# Patient Record
Sex: FEMALE | Race: WHITE | NOT HISPANIC OR LATINO | Employment: FULL TIME | URBAN - METROPOLITAN AREA
[De-identification: names, ages, dates, MRNs, and addresses within clinical notes are randomized per-mention and may not be internally consistent; named-entity substitution may affect disease eponyms.]

---

## 2017-06-22 ENCOUNTER — OFFICE VISIT (OUTPATIENT)
Dept: SURGERY | Facility: CLINIC | Age: 62
End: 2017-06-22

## 2017-06-22 VITALS — BODY MASS INDEX: 23.14 KG/M2 | HEIGHT: 63 IN | HEART RATE: 73 BPM | WEIGHT: 130.6 LBS | OXYGEN SATURATION: 98 %

## 2017-06-22 DIAGNOSIS — K64.5 THROMBOSED EXTERNAL HEMORRHOID: Primary | ICD-10-CM

## 2017-06-22 PROCEDURE — 99241 PR OFFICE CONSULTATION NEW/ESTAB PATIENT 15 MIN: CPT | Performed by: SURGERY

## 2017-06-22 RX ORDER — BUPROPION HYDROCHLORIDE 150 MG/1
150 TABLET ORAL EVERY MORNING
COMMUNITY
Start: 2017-06-11

## 2017-06-22 RX ORDER — MELOXICAM 15 MG/1
15 TABLET ORAL DAILY
COMMUNITY
Start: 2017-05-21 | End: 2021-11-22

## 2017-06-22 RX ORDER — OXYBUTYNIN CHLORIDE 10 MG/1
10 TABLET, EXTENDED RELEASE ORAL DAILY
COMMUNITY
Start: 2017-05-03 | End: 2021-11-22

## 2017-06-25 NOTE — PROGRESS NOTES
CLINICAL SUMMARY (A/P):    61-year-old lady with small thrombosed external hemorrhoid which will resolve on its own and requires no further intervention or workup.      CC:  Anal mass    HPI:  61-year-old lady developed anal mass/protrusion one week ago after suffering from intermittent constipation in the last year.    PHYSICAL EXAM:   Gastrointestinal: Rectal examination reveals a small thrombosed external hemorrhoid, minimally tender    Paresh Mata M.D.

## 2018-08-29 DIAGNOSIS — L24.7 IRRITANT CONTACT DERMATITIS DUE TO PLANTS, EXCEPT FOOD: ICD-10-CM

## 2018-08-29 RX ORDER — TRIAMCINOLONE ACETONIDE 5 MG/G
CREAM TOPICAL 3 TIMES DAILY
Qty: 60 G | Refills: 0 | OUTPATIENT
Start: 2018-08-29

## 2021-11-22 ENCOUNTER — OFFICE VISIT (OUTPATIENT)
Dept: SURGERY | Facility: CLINIC | Age: 66
End: 2021-11-22

## 2021-11-22 VITALS — BODY MASS INDEX: 23.92 KG/M2 | HEIGHT: 63 IN | WEIGHT: 135 LBS

## 2021-11-22 DIAGNOSIS — M94.0 COSTOCHONDRITIS: Primary | ICD-10-CM

## 2021-11-22 PROCEDURE — 99204 OFFICE O/P NEW MOD 45 MIN: CPT | Performed by: SURGERY

## 2021-11-22 RX ORDER — MELOXICAM 15 MG/1
15 TABLET ORAL DAILY
Qty: 14 TABLET | Refills: 0 | Status: SHIPPED | OUTPATIENT
Start: 2021-11-22 | End: 2022-03-16

## 2021-11-22 RX ORDER — NITROFURANTOIN 25; 75 MG/1; MG/1
100 CAPSULE ORAL AS NEEDED
COMMUNITY
Start: 2021-02-03

## 2021-11-22 RX ORDER — FLUTICASONE PROPIONATE 50 MCG
2 SPRAY, SUSPENSION (ML) NASAL DAILY
COMMUNITY

## 2021-11-22 RX ORDER — ONDANSETRON 4 MG/1
4 TABLET, FILM COATED ORAL AS NEEDED
COMMUNITY
Start: 2021-10-19

## 2021-11-22 RX ORDER — OMEPRAZOLE 40 MG/1
40 CAPSULE, DELAYED RELEASE ORAL DAILY
COMMUNITY
Start: 2021-11-21

## 2021-11-22 RX ORDER — MULTIVIT WITH MINERALS/LUTEIN
1000 TABLET ORAL DAILY
COMMUNITY
End: 2022-04-27

## 2021-11-22 NOTE — PROGRESS NOTES
SUMMARY (A/P):    66-year-old lady with right flank pain and point tenderness along right lateral costal margin.  Etiology is unclear although given the temporal relationship to lifting episode in September, likely represents musculoskeletal strain.  With the point tenderness at the costal margin, costochondritis is also a possibility.  I reassured her that laparoscopic clips used during cholecystectomy are routine and have absolutely nothing to do with any symptoms she is experiencing.  After discussing the likely musculoskeletal source of her pain, we discussed options of doing nothing as her pain is slowly improving versus adding nonsteroidal anti-inflammatory and she would prefer the latter option.  Along those lines, I have sent in prescription for Mobic 15 mg p.o. daily for 2 weeks to see if this will help.      CC:    Right flank pain    HPI:    66-year-old lady indicates that she was lifting her dog in early September and developed sudden right flank pain and tenderness.  This has been slowly but steadily improving.  Plain x-rays were normal except for demonstrating clips used during laparoscopic cholecystectomy.    ENDOSCOPY:   • EGD and colonoscopy 2012: Normal    PREVIOUS ABDOMINAL SURGERY    • Laparoscopic cholecystectomy with cholangiogram  •  section  • Appendectomy  • Ovarian cyst removal    PMH:    • Anxiety  • Arthritis  • Gastroesophageal reflux disease (she indicates that she really does not have heartburn or reflux symptoms but was told that she might have laryngopharyngeal reflux during ENT evaluation for which she was started on Prilosec.  Not surprisingly, she has not seen any real change in her symptoms with taking Prilosec as I do not feel that her symptoms are related to reflux given my conversation with her)    ALLERGIES:   • Reviewed    MEDICATIONS:   • Reviewed    FAMILY HISTORY:    • Colorectal cancer: Negative    SOCIAL HISTORY:   • Denies tobacco use  • Occasional alcohol  use    PHYSICAL EXAM:   • Constitutional: Well-developed well-nourished, no acute distress  • Vital signs: Weight 135 pounds, height 63 inches, BMI 23.9  • Respiratory: Clear to auscultation, normal inspiratory effort  • Cardiovascular: Regular rate, no jugular venous distention  • Gastrointestinal: Soft, nontender, no palpable mass, no hepatosplenomegaly, negative for hernia.  She is tender over the lateral aspect of the costal margin at 1 isolated point.  There is no palpable subcutaneous or rib mass at that point.  • Lymphatics (palpable nodes):  cervical-negative, inguinal-negative  • Skin:  Warm, dry, no rash on visualized skin surfaces  • Musculoskeletal: Symmetric strength, normal gait  • Psychiatric: Alert and oriented ×3, normal affect     ROS:    No cough, chest pain, shortness of air.  All other systems reviewed and negative other than presenting complaints.    ROYA CORDOBA M.D.

## 2022-01-05 ENCOUNTER — TELEPHONE (OUTPATIENT)
Dept: SURGERY | Facility: CLINIC | Age: 67
End: 2022-01-05

## 2022-01-05 DIAGNOSIS — R31.0 GROSS HEMATURIA: ICD-10-CM

## 2022-01-05 DIAGNOSIS — R10.84 GENERALIZED ABDOMINAL PAIN: Primary | ICD-10-CM

## 2022-01-05 NOTE — TELEPHONE ENCOUNTER
HUB TO SHARE    OK PER [facility] VERBAL RELEASE    PER DR MATA PATIENT SHOULD STAY OFF OF MOBIC, CT ABD & PELVIS ORDER PLACED & URINALYSIS ORDER PLACED.   PLEASE RELAY BELOW MESSAGE THAT WAS SENT TO PATIENT VIA HER [employer] ALSO.    I left you a voicemail at [phone] letting you know that Dr. Mata has placed an order for you to have a CT scan of the Abdomen & Pelvis and also a Urinalysis order. It can take anywhere from 3 to 5 business days before you receive a call from [facility] Central Scheduling. If you would like to contact them directly you may call [phone] to schedule your CT scan but please be aware that it may require a prior authorization from our office and that may take a few days to get approved. You do not need to schedule an appointment for the Urinalysis so you can go Monday - Friday 6:00 am to 5:00 pm. You would need to go to the main entrance (A) of the hospital campus and someone will direct you where the Outpatient Lab is located.    Should you have any additional questions, please do not hesitate to contact me directly.  Thank You,  EDMUNDO Shah

## 2022-01-05 NOTE — TELEPHONE ENCOUNTER
----- Message from Sherrene H. Mills sent at 1/4/2022  5:04 PM EST -----  Regarding: Tenderness pain under bilateral rib cages   Thank you so much

## 2022-01-23 PROBLEM — J06.9 VIRAL UPPER RESPIRATORY TRACT INFECTION: Status: ACTIVE | Noted: 2022-01-23

## 2022-01-27 ENCOUNTER — APPOINTMENT (OUTPATIENT)
Dept: CT IMAGING | Facility: HOSPITAL | Age: 67
End: 2022-01-27

## 2022-03-07 DIAGNOSIS — R31.0 GROSS HEMATURIA: ICD-10-CM

## 2022-03-07 DIAGNOSIS — R10.84 GENERALIZED ABDOMINAL PAIN: ICD-10-CM

## 2022-03-08 ENCOUNTER — TELEPHONE (OUTPATIENT)
Dept: SURGERY | Facility: CLINIC | Age: 67
End: 2022-03-08

## 2022-03-08 NOTE — TELEPHONE ENCOUNTER
----- Message from Paresh Mata MD sent at 3/8/2022  7:43 AM EST -----  Please let her know that her CT scan was normal

## 2022-03-09 ENCOUNTER — TELEPHONE (OUTPATIENT)
Dept: SURGERY | Facility: CLINIC | Age: 67
End: 2022-03-09

## 2022-03-16 ENCOUNTER — PATIENT ROUNDING (BHMG ONLY) (OUTPATIENT)
Dept: ORTHOPEDIC SURGERY | Facility: CLINIC | Age: 67
End: 2022-03-16

## 2022-03-16 ENCOUNTER — OFFICE VISIT (OUTPATIENT)
Dept: ORTHOPEDIC SURGERY | Facility: CLINIC | Age: 67
End: 2022-03-16

## 2022-03-16 VITALS — TEMPERATURE: 96.9 F | BODY MASS INDEX: 22.86 KG/M2 | WEIGHT: 129 LBS | HEIGHT: 63 IN

## 2022-03-16 DIAGNOSIS — M25.562 LEFT KNEE PAIN, UNSPECIFIED CHRONICITY: Primary | ICD-10-CM

## 2022-03-16 DIAGNOSIS — M23.92 LOCKING OF LEFT KNEE: ICD-10-CM

## 2022-03-16 PROCEDURE — 73562 X-RAY EXAM OF KNEE 3: CPT | Performed by: ORTHOPAEDIC SURGERY

## 2022-03-16 PROCEDURE — 99204 OFFICE O/P NEW MOD 45 MIN: CPT | Performed by: ORTHOPAEDIC SURGERY

## 2022-03-16 NOTE — PROGRESS NOTES
"Patient Name: Sherrene H Mills   YOB: 1955  Referring Primary Care Physician: Elena Yip MD  BMI: Body mass index is 22.85 kg/m².    Chief Complaint:    Chief Complaint   Patient presents with   • Left Knee - Pain        HPI:     Sherrene H Mills is a 66 y.o. female who presents today for evaluation of   Chief Complaint   Patient presents with   • Left Knee - Pain   Patient is seen today complaining of left knee pain.  She says she has seen Dr. Martin peres in the past and she believes had a right knee scope for meniscectomy at that time.  She says approximately 4 days ago she was carrying something through the garage had to twist to get to the door and felt a \"zinging\" in her knee.  She says if she walks with her leg with the toes pointed forward does pretty well if she attempts to rotate her knee it hurts any kind of twisting causes it to \"zinging\" she works at [email or web address].  She has been taking ibuprofen up to 200 3 times daily and acetaminophen 500 twice daily that do help.  She is also wearing a wraparound brace that she has with her today      Subjective   Medications:   Home Medications:  Current Outpatient Medications on File Prior to Visit   Medication Sig   • ascorbic acid (VITAMIN C) 1000 MG tablet Take 1,000 mg by mouth Daily.   • buPROPion XL (WELLBUTRIN XL) 150 MG 24 hr tablet Take 150 mg by mouth Every Morning.   • Cholecalciferol (VITAMIN D3 PO) Take  by mouth.   • MAGNESIUM PO Take  by mouth.   • Menaquinone-7 (VITAMIN K2 PO) Take 1 tablet by mouth Daily.   • Multiple Vitamins-Minerals (ZINC PO) Take 1 tablet by mouth Daily.   • ondansetron (ZOFRAN) 4 MG tablet Take 4 mg by mouth As Needed.   • fluticasone (FLONASE) 50 MCG/ACT nasal spray 2 sprays into the nostril(s) as directed by provider Daily.   • nitrofurantoin, macrocrystal-monohydrate, (MACROBID) 100 MG capsule Take 100 mg by mouth 2 (Two) Times a Day.   • omeprazole (priLOSEC) 40 MG capsule Take 40 mg by " mouth Daily.   • VITAMIN D PO Take 1 tablet by mouth Daily.   • [DISCONTINUED] meloxicam (Mobic) 15 MG tablet Take 1 tablet by mouth Daily.     No current facility-administered medications on file prior to visit.     Current Medications:  Scheduled Meds:  Continuous Infusions:No current facility-administered medications for this visit.    PRN Meds:.    I have reviewed the patient's medical history in detail and updated the computerized patient record.  Review and summarization of old records includes:    Past Medical History:   Diagnosis Date   • Anxiety    • Arthritis    • GERD (gastroesophageal reflux disease)    • H/O bladder infections    • Hearing loss    • Laryngopharyngeal reflux (LPR)    • Lumbar disc disease    • Mal de debarquement    • PONV (postoperative nausea and vomiting)         Past Surgical History:   Procedure Laterality Date   • APPENDECTOMY N/A    • CARPAL TUNNEL RELEASE     •  SECTION N/A    • ENDOMETRIAL ABLATION N/A    • ENDOSCOPY AND COLONOSCOPY N/A 2012    Normal upper endoscopy and normal colon; Dr. Paresh Mata   • ENDOSCOPY AND COLONOSCOPY N/A 2002    Three seperate prepyloric ulcers, mild duodenitis, normal remainder of stomach and esophagus, normal colonoscopy with excellent bowel prep; Dr. Paresh Mata   • EYE SURGERY     • KNEE ARTHROSCOPY W/ PARTIAL MEDIAL MENISCECTOMY Left 2006    Dr. Raudel De Guzman   • LAPAROSCOPIC CHOLECYSTECTOMY N/A 2012    Dr. Paresh Mata   • LUMBAR EPIDURAL INJECTION N/A    • OVARIAN CYST REMOVAL     • TONSILLECTOMY AND ADENOIDECTOMY Bilateral         Social History     Occupational History   • Not on file   Tobacco Use   • Smoking status: Never Smoker   • Smokeless tobacco: Never Used   Vaping Use   • Vaping Use: Never used   Substance and Sexual Activity   • Alcohol use: Yes     Alcohol/week: 0.0 standard drinks     Comment: Occasional   • Drug use: Never   • Sexual activity: Not Currently      "Birth control/protection: Post-menopausal      Social History     Social History Narrative   • Not on file        Family History   Problem Relation Age of Onset   • Cancer Father         Bladder   • Heart disease Father    • Arthritis Father    • Hypertension Father    • Hypertension Mother    • Arthritis Mother        ROS: 14 point review of systems was performed and all other systems were reviewed and are negative except for documented findings in HPI and today's encounter.     Allergies:   Allergies   Allergen Reactions   • Codeine Hives, Shortness Of Breath and Rash   • Percocet [Oxycodone-Acetaminophen] Nausea And Vomiting   • Sulfa Antibiotics Rash     Constitutional:  Denies fever, shaking or chills   Eyes:  Denies change in visual acuity   HENT:  Denies nasal congestion or sore throat   Respiratory:  Denies cough or shortness of breath   Cardiovascular:  Denies chest pain or severe LE edema   GI:  Denies abdominal pain, nausea, vomiting, bloody stools or diarrhea   Musculoskeletal:  Numbness, tingling, pain, or loss of motor function only as noted above in history of present illness.  : Denies painful urination or hematuria  Integument:  Denies rash, lesion or ulceration   Neurologic:  Denies headache or focal weakness  Endocrine:  Denies lymphadenopathy  Psych:  Denies confusion or change in mental status   Hem:  Denies active bleeding    OBJECTIVE:  Physical Exam: 66 y.o. female  Wt Readings from Last 3 Encounters:   03/16/22 58.5 kg (129 lb)   01/23/22 61.2 kg (135 lb)   11/22/21 61.2 kg (135 lb)     Ht Readings from Last 1 Encounters:   03/16/22 160 cm (63\")     Body mass index is 22.85 kg/m².  Vitals:    03/16/22 1027   Temp: 96.9 °F (36.1 °C)     Vital signs reviewed.     General Appearance:    Alert, cooperative, in no acute distress                  Eyes: conjunctiva clear  ENT: external ears and nose atraumatic  CV: no peripheral edema  Resp: normal respiratory effort  Skin: no rashes or wounds; " normal turgor  Psych: mood and affect appropriate  Lymph: no nodes appreciated  Neuro: gross sensation intact  Vascular:  Palpable peripheral pulse in noted extremity  Musculoskeletal Extremities: Exam today shows pleasant lady any attempts to Kristine's cause her pain medially her ligaments feel grossly stable see any bruising there is mild swelling but no true effusion.  She has apprehensive range of motion present    Radiology:   AP lateral 30 degree PA x-ray left knee taken the office today for complaints of pain without comparison views show least moderate arthritis.        Assessment:     ICD-10-CM ICD-9-CM   1. Left knee pain, unspecified chronicity  M25.562 719.46   2. Locking of left knee  M23.92 717.9        MDM/Plan:   The diagnosis(es), natural history, pathophysiology and treatment for diagnosis(es) were discussed. Opportunity given and questions answered.  Biomechanics of pertinent body areas discussed.  When appropriate, the use of ambulatory aids discussed.    MEDICATIONS:  The risks, benefits, warnings,side effects and alternatives of medications discussed.  Inflammation/pain control; with cold, heat, elevation and/or liniments discussed as appropriate  MRI.  Because of her mechanical symptoms and history and suspicious for meniscal tear.  She denies any falls or slips or anything which just a simple low-energy twist.  1 follow-up after the MRI talked about possible scenarios according to what is found as far as meniscal pathology and/or amount of arthritis.    3/16/2022    Dictated utilizing Dragon dictation

## 2022-03-16 NOTE — PROGRESS NOTES
A [employer] message has been sent to the patient for PATIENT ROUNDING with [facility]

## 2022-03-22 ENCOUNTER — TELEPHONE (OUTPATIENT)
Dept: ORTHOPEDIC SURGERY | Facility: CLINIC | Age: 67
End: 2022-03-22

## 2022-03-22 NOTE — TELEPHONE ENCOUNTER
----- Message from Sherrene H. Mills sent at 3/21/2022  6:07 PM EDT -----  Regarding: MRI   Can we have another doctor review it to let me know the results   Thank you

## 2022-03-22 NOTE — TELEPHONE ENCOUNTER
Please let her know that it looks like she has a meniscus tear and a small cyst in the back of her knee.  She can talk to Dr. Yarbrough when he gets back about options to address this.

## 2022-03-28 ENCOUNTER — TELEPHONE (OUTPATIENT)
Dept: ORTHOPEDIC SURGERY | Facility: CLINIC | Age: 67
End: 2022-03-28

## 2022-03-31 ENCOUNTER — TELEPHONE (OUTPATIENT)
Dept: ORTHOPEDIC SURGERY | Facility: CLINIC | Age: 67
End: 2022-03-31

## 2022-03-31 NOTE — TELEPHONE ENCOUNTER
Per last telephone encounter Dr. Yarbrough wants to see the patient in office. Please call to schedule

## 2022-03-31 NOTE — TELEPHONE ENCOUNTER
Provider:     Caller: PATIENT    Relationship to Patient: SELF    Phone Number:  [phone]    Reason for Call:  PT. STATES THAT DR. MONROE HAD BEEN OUT OF OFFICE AND ANOTHER DOCTOR HAD REVIEWED HER MRI OF LEFT KNEE RESULTS.   SHE WOULD LIKE TO KNOW WHAT DR. MONROE ADVISES FOR HER NEXT STEP.   ASKING FOR CALL BACK FROM DR. MONROE OR ASSISTANT PLEASE.    When was the patient last seen: 03/16/22

## 2022-04-14 ENCOUNTER — PREP FOR SURGERY (OUTPATIENT)
Dept: OTHER | Facility: HOSPITAL | Age: 67
End: 2022-04-14

## 2022-04-14 ENCOUNTER — OFFICE VISIT (OUTPATIENT)
Dept: ORTHOPEDIC SURGERY | Facility: CLINIC | Age: 67
End: 2022-04-14

## 2022-04-14 VITALS — BODY MASS INDEX: 23.92 KG/M2 | HEIGHT: 63 IN | WEIGHT: 135 LBS

## 2022-04-14 DIAGNOSIS — S83.242D ACUTE MEDIAL MENISCUS TEAR OF LEFT KNEE, SUBSEQUENT ENCOUNTER: ICD-10-CM

## 2022-04-14 DIAGNOSIS — S83.242A ACUTE MEDIAL MENISCUS TEAR OF LEFT KNEE, INITIAL ENCOUNTER: Primary | ICD-10-CM

## 2022-04-14 DIAGNOSIS — M23.91 LOCKING OF RIGHT KNEE: Primary | ICD-10-CM

## 2022-04-14 PROCEDURE — 99214 OFFICE O/P EST MOD 30 MIN: CPT | Performed by: ORTHOPAEDIC SURGERY

## 2022-04-14 PROCEDURE — 20610 DRAIN/INJ JOINT/BURSA W/O US: CPT | Performed by: ORTHOPAEDIC SURGERY

## 2022-04-14 RX ORDER — CEFAZOLIN SODIUM 2 G/100ML
2 INJECTION, SOLUTION INTRAVENOUS ONCE
Status: CANCELLED | OUTPATIENT
Start: 2022-04-29 | End: 2022-04-14

## 2022-04-14 RX ADMIN — METHYLPREDNISOLONE ACETATE 80 MG: 80 INJECTION, SUSPENSION INTRA-ARTICULAR; INTRALESIONAL; INTRAMUSCULAR; SOFT TISSUE at 08:49

## 2022-04-14 NOTE — PROGRESS NOTES
"Patient Name: Sherrene H Mills   YOB: 1955  Referring Primary Care Physician: Elena Yip MD  BMI: Body mass index is 23.91 kg/m².    Chief Complaint:  No chief complaint on file.  Follow-up MRI and left knee pain with new right knee pain    HPI:     Sherrene H Mills is a 66 y.o. female who presents today for evaluation of No chief complaint on file.  .  Patient follows up today on her MRI of her left knee.  It does show a meniscal tear.  She has tenderness.  Feels a \"stinging\" is not getting better.  She had since then patient is symptoms of her right knee.  They are very similar.  There is more swelling.  She is having trouble walking.      Subjective   Medications:   Home Medications:  Current Outpatient Medications on File Prior to Visit   Medication Sig   • ascorbic acid (VITAMIN C) 1000 MG tablet Take 1,000 mg by mouth Daily.   • buPROPion XL (WELLBUTRIN XL) 150 MG 24 hr tablet Take 150 mg by mouth Every Morning.   • Cholecalciferol (VITAMIN D3 PO) Take  by mouth.   • fluticasone (FLONASE) 50 MCG/ACT nasal spray 2 sprays into the nostril(s) as directed by provider Daily.   • MAGNESIUM PO Take  by mouth.   • Menaquinone-7 (VITAMIN K2 PO) Take 1 tablet by mouth Daily.   • Multiple Vitamins-Minerals (ZINC PO) Take 1 tablet by mouth Daily.   • nitrofurantoin, macrocrystal-monohydrate, (MACROBID) 100 MG capsule Take 100 mg by mouth 2 (Two) Times a Day.   • omeprazole (priLOSEC) 40 MG capsule Take 40 mg by mouth Daily.   • ondansetron (ZOFRAN) 4 MG tablet Take 4 mg by mouth As Needed.   • VITAMIN D PO Take 1 tablet by mouth Daily.     No current facility-administered medications on file prior to visit.     Current Medications:  Scheduled Meds:  Continuous Infusions:No current facility-administered medications for this visit.    PRN Meds:.    I have reviewed the patient's medical history in detail and updated the computerized patient record.  Review and summarization of old records " includes:    Past Medical History:   Diagnosis Date   • Anxiety    • Arthritis    • GERD (gastroesophageal reflux disease)    • H/O bladder infections    • Hearing loss    • Laryngopharyngeal reflux (LPR)    • Lumbar disc disease    • Mal de debarquement    • PONV (postoperative nausea and vomiting)         Past Surgical History:   Procedure Laterality Date   • APPENDECTOMY N/A    • CARPAL TUNNEL RELEASE     •  SECTION N/A    • ENDOMETRIAL ABLATION N/A    • ENDOSCOPY AND COLONOSCOPY N/A 2012    Normal upper endoscopy and normal colon; Dr. Paresh Mata   • ENDOSCOPY AND COLONOSCOPY N/A 2002    Three seperate prepyloric ulcers, mild duodenitis, normal remainder of stomach and esophagus, normal colonoscopy with excellent bowel prep; Dr. Paresh Mata   • EYE SURGERY     • KNEE ARTHROSCOPY W/ PARTIAL MEDIAL MENISCECTOMY Left 2006    Dr. Raudel De Guzman   • LAPAROSCOPIC CHOLECYSTECTOMY N/A 2012    Dr. Paresh Mata   • LUMBAR EPIDURAL INJECTION N/A    • OVARIAN CYST REMOVAL     • TONSILLECTOMY AND ADENOIDECTOMY Bilateral         Social History     Occupational History   • Not on file   Tobacco Use   • Smoking status: Never Smoker   • Smokeless tobacco: Never Used   Vaping Use   • Vaping Use: Never used   Substance and Sexual Activity   • Alcohol use: Yes     Alcohol/week: 0.0 standard drinks     Comment: Occasional   • Drug use: Never   • Sexual activity: Not Currently     Birth control/protection: Post-menopausal      Social History     Social History Narrative   • Not on file        Family History   Problem Relation Age of Onset   • Cancer Father         Bladder   • Heart disease Father    • Arthritis Father    • Hypertension Father    • Hypertension Mother    • Arthritis Mother        ROS: 14 point review of systems was performed and all other systems were reviewed and are negative except for documented findings in HPI and today's encounter.     Allergies:  "  Allergies   Allergen Reactions   • Codeine Hives, Shortness Of Breath and Rash   • Percocet [Oxycodone-Acetaminophen] Nausea And Vomiting   • Sulfa Antibiotics Rash     Constitutional:  Denies fever, shaking or chills   Eyes:  Denies change in visual acuity   HENT:  Denies nasal congestion or sore throat   Respiratory:  Denies cough or shortness of breath   Cardiovascular:  Denies chest pain or severe LE edema   GI:  Denies abdominal pain, nausea, vomiting, bloody stools or diarrhea   Musculoskeletal:  Numbness, tingling, pain, or loss of motor function only as noted above in history of present illness.  : Denies painful urination or hematuria  Integument:  Denies rash, lesion or ulceration   Neurologic:  Denies headache or focal weakness  Endocrine:  Denies lymphadenopathy  Psych:  Denies confusion or change in mental status   Hem:  Denies active bleeding    OBJECTIVE:  Physical Exam: 66 y.o. female  Wt Readings from Last 3 Encounters:   04/14/22 61.2 kg (135 lb)   03/16/22 58.5 kg (129 lb)   01/23/22 61.2 kg (135 lb)     Ht Readings from Last 1 Encounters:   04/14/22 160 cm (63\")     Body mass index is 23.91 kg/m².  There were no vitals filed for this visit.  Vital signs reviewed.     General Appearance:    Alert, cooperative, in no acute distress                  Eyes: conjunctiva clear  ENT: external ears and nose atraumatic  CV: no peripheral edema  Resp: normal respiratory effort  Skin: no rashes or wounds; normal turgor  Psych: mood and affect appropriate  Lymph: no nodes appreciated  Neuro: gross sensation intact  Vascular:  Palpable peripheral pulse in noted extremity  Musculoskeletal Extremities: Exam shows swelling more on the right than the left but she has joint line tenderness Kristine's causes tenderness medially on the left she has similar findings on the right.  She is stiff and a little apprehensive with swelling    Radiology:   AP lateral 40 degree PA x-rays previously taken with views of both " knees and a show some joint space remaining.  She had an MRI of her left knee which she follows up on today which shows horizontal oblique tear of the body medial meniscus extending to the inferior articular surface, mild thickening of the medial articular cartilage suggesting mild chondral degeneration.  There is also some in the apical patellar cartilage.  Fluid collection along the posterior lateral aspect of the knee at the origin of the lateral head of the gastrocnemius muscle measuring 1.6 x 0.8 cm likely reflecting a septated synovial ganglion cyst with minimal joint effusion        Assessment:     ICD-10-CM ICD-9-CM   1. Locking of right knee  M23.91 717.9   2. Acute medial meniscus tear of left knee, subsequent encounter  S83.242D V58.89     836.0        MDM/Plan:   The diagnosis(es), natural history, pathophysiology and treatment for diagnosis(es) were discussed. Opportunity given and questions answered.  Biomechanics of pertinent body areas discussed.  When appropriate, the use of ambulatory aids discussed.    Inflammation/pain control; with cold, heat, elevation and/or liniments discussed as appropriate  Cortisone Injection. See procedure note.  MRI.  Knee Arthroscopy:  We will plan on proceeding with surgery at patient's request for a knee arthroscopy, meniscal and cartilage surgery as indicated.  I reviewed details of procedure with patient today and discussed risks, benefits, alternatives, and limitations, especially if arthritis present, of the procedure in laymen's terms with the risks including but not limited to:  neurovascular damage, bleeding, infection, chronic pain, worsening of pain, chondrolysis, recurrent meniscal tear, swelling, loss of motion, weakness, stiffness, instability, DVT, pulmonary embolus, death, stroke, complex regional pain syndrome, and need for additional procedures.  Patient verbalized understanding, and was given the opportunity to ask and have all questions answered  today.  No guarantees were given regarding results of surgery.    She wants to go ahead with left knee arthroscopy went over the procedure risk benefits complications limitations.  Before that however I want her to get an MRI of her right knee.  We went ahead and injected it to see if it we can bring the swelling down prior to anticipated surgery which she feels she needs to do is is just getting worse.  See what the right knee MRI shows.  I went over the risk benefits complications of the procedure.  I told her is very unusual left to bilateral procedures Devika see how she is doing and how her symptoms are and have her call after the MRIs  4/15/2022    Dictated utilizing Dragon dictation    Large Joint Arthrocentesis: R knee  Date/Time: 4/14/2022 8:49 AM  Consent given by: patient  Site marked: site marked  Timeout: Immediately prior to procedure a time out was called to verify the correct patient, procedure, equipment, support staff and site/side marked as required   Supporting Documentation  Indications: pain and joint swelling   Procedure Details  Location: knee - R knee  Preparation: Patient was prepped and draped in the usual sterile fashion  Needle size: 22 G  Approach: anterolateral  Medications administered: 80 mg methylPREDNISolone acetate 80 MG/ML; 4 mL lidocaine (cardiac)  Patient tolerance: patient tolerated the procedure well with no immediate complications           Detail Level: Detailed

## 2022-04-15 RX ORDER — METHYLPREDNISOLONE ACETATE 80 MG/ML
80 INJECTION, SUSPENSION INTRA-ARTICULAR; INTRALESIONAL; INTRAMUSCULAR; SOFT TISSUE
Status: COMPLETED | OUTPATIENT
Start: 2022-04-14 | End: 2022-04-14

## 2022-04-19 ENCOUNTER — TELEPHONE (OUTPATIENT)
Dept: ORTHOPEDIC SURGERY | Facility: CLINIC | Age: 67
End: 2022-04-19

## 2022-04-19 NOTE — TELEPHONE ENCOUNTER
Caller: Mills, Sherrene H    Relationship: Self    Best call back number: [phone] CELL [phone] (DIRECT WORK LINE)    Caller requesting test results: SHERRENE MILLS    What test was performed: MRI RIGHT KNEE    When was the test performed: 04/18/2022    Where was the test performed: [facility]    Additional notes: PT STATED THAT THE PROVIDER SAID SHE DIDN'T NEED AN APPT TO GET HER MRI RESULTS. SHE STATED THAT SHE COULD CALL AND GET THE RESULTS. PLEASE CALL PATIENT. THANK YOU.

## 2022-04-20 PROBLEM — S83.242A ACUTE MEDIAL MENISCUS TEAR OF LEFT KNEE: Status: ACTIVE | Noted: 2022-04-20

## 2022-04-27 ENCOUNTER — PRE-ADMISSION TESTING (OUTPATIENT)
Dept: PREADMISSION TESTING | Facility: HOSPITAL | Age: 67
End: 2022-04-27

## 2022-04-27 VITALS
DIASTOLIC BLOOD PRESSURE: 78 MMHG | HEIGHT: 63 IN | RESPIRATION RATE: 16 BRPM | TEMPERATURE: 98.8 F | SYSTOLIC BLOOD PRESSURE: 158 MMHG | HEART RATE: 64 BPM | OXYGEN SATURATION: 97 % | WEIGHT: 133.7 LBS | BODY MASS INDEX: 23.69 KG/M2

## 2022-04-27 DIAGNOSIS — S83.242A ACUTE MEDIAL MENISCUS TEAR OF LEFT KNEE, INITIAL ENCOUNTER: ICD-10-CM

## 2022-04-27 LAB
ANION GAP SERPL CALCULATED.3IONS-SCNC: 10 MMOL/L (ref 5–15)
BUN SERPL-MCNC: 11 MG/DL (ref 8–23)
BUN/CREAT SERPL: 16.7 (ref 7–25)
CALCIUM SPEC-SCNC: 9.5 MG/DL (ref 8.6–10.5)
CHLORIDE SERPL-SCNC: 103 MMOL/L (ref 98–107)
CO2 SERPL-SCNC: 27 MMOL/L (ref 22–29)
CREAT SERPL-MCNC: 0.66 MG/DL (ref 0.57–1)
DEPRECATED RDW RBC AUTO: 41.8 FL (ref 37–54)
EGFRCR SERPLBLD CKD-EPI 2021: 98.1 ML/MIN/1.73
ERYTHROCYTE [DISTWIDTH] IN BLOOD BY AUTOMATED COUNT: 13.3 % (ref 12.3–15.4)
GLUCOSE SERPL-MCNC: 86 MG/DL (ref 65–99)
HCT VFR BLD AUTO: 44.6 % (ref 34–46.6)
HGB BLD-MCNC: 15 G/DL (ref 12–15.9)
MCH RBC QN AUTO: 29 PG (ref 26.6–33)
MCHC RBC AUTO-ENTMCNC: 33.6 G/DL (ref 31.5–35.7)
MCV RBC AUTO: 86.1 FL (ref 79–97)
PLATELET # BLD AUTO: 276 10*3/MM3 (ref 140–450)
PMV BLD AUTO: 10.5 FL (ref 6–12)
POTASSIUM SERPL-SCNC: 4.9 MMOL/L (ref 3.5–5.2)
QT INTERVAL: 435 MS
RBC # BLD AUTO: 5.18 10*6/MM3 (ref 3.77–5.28)
SARS-COV-2 ORF1AB RESP QL NAA+PROBE: NOT DETECTED
SODIUM SERPL-SCNC: 140 MMOL/L (ref 136–145)
WBC NRBC COR # BLD: 5.4 10*3/MM3 (ref 3.4–10.8)

## 2022-04-27 PROCEDURE — 85027 COMPLETE CBC AUTOMATED: CPT

## 2022-04-27 PROCEDURE — C9803 HOPD COVID-19 SPEC COLLECT: HCPCS

## 2022-04-27 PROCEDURE — 93010 ELECTROCARDIOGRAM REPORT: CPT | Performed by: INTERNAL MEDICINE

## 2022-04-27 PROCEDURE — 36415 COLL VENOUS BLD VENIPUNCTURE: CPT

## 2022-04-27 PROCEDURE — 80048 BASIC METABOLIC PNL TOTAL CA: CPT

## 2022-04-27 PROCEDURE — 93005 ELECTROCARDIOGRAM TRACING: CPT

## 2022-04-27 PROCEDURE — U0004 COV-19 TEST NON-CDC HGH THRU: HCPCS

## 2022-04-29 ENCOUNTER — ANESTHESIA (OUTPATIENT)
Dept: PERIOP | Facility: HOSPITAL | Age: 67
End: 2022-04-29

## 2022-04-29 ENCOUNTER — HOSPITAL ENCOUNTER (OUTPATIENT)
Facility: HOSPITAL | Age: 67
Setting detail: HOSPITAL OUTPATIENT SURGERY
Discharge: HOME | End: 2022-04-29
Attending: ORTHOPAEDIC SURGERY | Admitting: ORTHOPAEDIC SURGERY

## 2022-04-29 ENCOUNTER — ANESTHESIA EVENT (OUTPATIENT)
Dept: PERIOP | Facility: HOSPITAL | Age: 67
End: 2022-04-29

## 2022-04-29 VITALS
DIASTOLIC BLOOD PRESSURE: 85 MMHG | RESPIRATION RATE: 16 BRPM | OXYGEN SATURATION: 98 % | TEMPERATURE: 97.8 F | SYSTOLIC BLOOD PRESSURE: 132 MMHG | HEART RATE: 82 BPM

## 2022-04-29 DIAGNOSIS — S83.242A ACUTE MEDIAL MENISCUS TEAR OF LEFT KNEE, INITIAL ENCOUNTER: ICD-10-CM

## 2022-04-29 PROCEDURE — 25010000002 PROPOFOL 10 MG/ML EMULSION: Performed by: NURSE ANESTHETIST, CERTIFIED REGISTERED

## 2022-04-29 PROCEDURE — 25010000002 MIDAZOLAM PER 1 MG: Performed by: ANESTHESIOLOGY

## 2022-04-29 PROCEDURE — 25010000002 FENTANYL CITRATE (PF) 50 MCG/ML SOLUTION: Performed by: NURSE ANESTHETIST, CERTIFIED REGISTERED

## 2022-04-29 PROCEDURE — 25010000002 DEXAMETHASONE PER 1 MG: Performed by: NURSE ANESTHETIST, CERTIFIED REGISTERED

## 2022-04-29 PROCEDURE — 25010000002 CEFAZOLIN IN DEXTROSE 2-4 GM/100ML-% SOLUTION: Performed by: ORTHOPAEDIC SURGERY

## 2022-04-29 PROCEDURE — 25010000002 EPINEPHRINE PER 0.1 MG: Performed by: ORTHOPAEDIC SURGERY

## 2022-04-29 PROCEDURE — 25010000002 KETOROLAC TROMETHAMINE PER 15 MG: Performed by: NURSE ANESTHETIST, CERTIFIED REGISTERED

## 2022-04-29 PROCEDURE — 29881 ARTHRS KNE SRG MNISECTMY M/L: CPT | Performed by: ORTHOPAEDIC SURGERY

## 2022-04-29 PROCEDURE — 25010000002 ONDANSETRON PER 1 MG: Performed by: NURSE ANESTHETIST, CERTIFIED REGISTERED

## 2022-04-29 RX ORDER — IBUPROFEN 600 MG/1
600 TABLET ORAL ONCE AS NEEDED
Status: DISCONTINUED | OUTPATIENT
Start: 2022-04-29 | End: 2022-04-29 | Stop reason: HOSPADM

## 2022-04-29 RX ORDER — DEXAMETHASONE SODIUM PHOSPHATE 10 MG/ML
INJECTION INTRAMUSCULAR; INTRAVENOUS AS NEEDED
Status: DISCONTINUED | OUTPATIENT
Start: 2022-04-29 | End: 2022-04-29 | Stop reason: SURG

## 2022-04-29 RX ORDER — HYDROCODONE BITARTRATE AND ACETAMINOPHEN 7.5; 325 MG/1; MG/1
1 TABLET ORAL ONCE AS NEEDED
Status: DISCONTINUED | OUTPATIENT
Start: 2022-04-29 | End: 2022-04-29 | Stop reason: HOSPADM

## 2022-04-29 RX ORDER — CEFAZOLIN SODIUM 2 G/100ML
2 INJECTION, SOLUTION INTRAVENOUS ONCE
Status: COMPLETED | OUTPATIENT
Start: 2022-04-29 | End: 2022-04-29

## 2022-04-29 RX ORDER — NALOXONE HCL 0.4 MG/ML
0.2 VIAL (ML) INJECTION AS NEEDED
Status: DISCONTINUED | OUTPATIENT
Start: 2022-04-29 | End: 2022-04-29 | Stop reason: HOSPADM

## 2022-04-29 RX ORDER — KETOROLAC TROMETHAMINE 30 MG/ML
INJECTION, SOLUTION INTRAMUSCULAR; INTRAVENOUS AS NEEDED
Status: DISCONTINUED | OUTPATIENT
Start: 2022-04-29 | End: 2022-04-29 | Stop reason: SURG

## 2022-04-29 RX ORDER — SODIUM CHLORIDE 0.9 % (FLUSH) 0.9 %
3-10 SYRINGE (ML) INJECTION AS NEEDED
Status: DISCONTINUED | OUTPATIENT
Start: 2022-04-29 | End: 2022-04-29 | Stop reason: HOSPADM

## 2022-04-29 RX ORDER — SODIUM CHLORIDE, SODIUM LACTATE, POTASSIUM CHLORIDE, CALCIUM CHLORIDE 600; 310; 30; 20 MG/100ML; MG/100ML; MG/100ML; MG/100ML
INJECTION, SOLUTION INTRAVENOUS CONTINUOUS PRN
Status: DISCONTINUED | OUTPATIENT
Start: 2022-04-29 | End: 2022-04-29 | Stop reason: SURG

## 2022-04-29 RX ORDER — MIDAZOLAM HYDROCHLORIDE 1 MG/ML
1 INJECTION INTRAMUSCULAR; INTRAVENOUS
Status: DISCONTINUED | OUTPATIENT
Start: 2022-04-29 | End: 2022-04-29 | Stop reason: HOSPADM

## 2022-04-29 RX ORDER — SODIUM CHLORIDE, SODIUM LACTATE, POTASSIUM CHLORIDE, CALCIUM CHLORIDE 600; 310; 30; 20 MG/100ML; MG/100ML; MG/100ML; MG/100ML
9 INJECTION, SOLUTION INTRAVENOUS CONTINUOUS
Status: DISCONTINUED | OUTPATIENT
Start: 2022-04-29 | End: 2022-04-29 | Stop reason: HOSPADM

## 2022-04-29 RX ORDER — DIPHENHYDRAMINE HCL 25 MG
25 CAPSULE ORAL
Status: DISCONTINUED | OUTPATIENT
Start: 2022-04-29 | End: 2022-04-29 | Stop reason: HOSPADM

## 2022-04-29 RX ORDER — LIDOCAINE HYDROCHLORIDE 10 MG/ML
0.5 INJECTION, SOLUTION EPIDURAL; INFILTRATION; INTRACAUDAL; PERINEURAL ONCE AS NEEDED
Status: DISCONTINUED | OUTPATIENT
Start: 2022-04-29 | End: 2022-04-29 | Stop reason: HOSPADM

## 2022-04-29 RX ORDER — PROPOFOL 10 MG/ML
VIAL (ML) INTRAVENOUS AS NEEDED
Status: DISCONTINUED | OUTPATIENT
Start: 2022-04-29 | End: 2022-04-29 | Stop reason: SURG

## 2022-04-29 RX ORDER — PROMETHAZINE HYDROCHLORIDE 25 MG/1
25 TABLET ORAL ONCE AS NEEDED
Status: DISCONTINUED | OUTPATIENT
Start: 2022-04-29 | End: 2022-04-29 | Stop reason: HOSPADM

## 2022-04-29 RX ORDER — FENTANYL CITRATE 50 UG/ML
50 INJECTION, SOLUTION INTRAMUSCULAR; INTRAVENOUS
Status: DISCONTINUED | OUTPATIENT
Start: 2022-04-29 | End: 2022-04-29 | Stop reason: HOSPADM

## 2022-04-29 RX ORDER — ONDANSETRON 2 MG/ML
INJECTION INTRAMUSCULAR; INTRAVENOUS AS NEEDED
Status: DISCONTINUED | OUTPATIENT
Start: 2022-04-29 | End: 2022-04-29 | Stop reason: SURG

## 2022-04-29 RX ORDER — PROMETHAZINE HYDROCHLORIDE 25 MG/1
25 SUPPOSITORY RECTAL ONCE AS NEEDED
Status: DISCONTINUED | OUTPATIENT
Start: 2022-04-29 | End: 2022-04-29 | Stop reason: HOSPADM

## 2022-04-29 RX ORDER — SODIUM CHLORIDE, SODIUM LACTATE, POTASSIUM CHLORIDE, CALCIUM CHLORIDE 600; 310; 30; 20 MG/100ML; MG/100ML; MG/100ML; MG/100ML
100 INJECTION, SOLUTION INTRAVENOUS CONTINUOUS
Status: DISCONTINUED | OUTPATIENT
Start: 2022-04-29 | End: 2022-04-29 | Stop reason: HOSPADM

## 2022-04-29 RX ORDER — ONDANSETRON 2 MG/ML
4 INJECTION INTRAMUSCULAR; INTRAVENOUS ONCE AS NEEDED
Status: DISCONTINUED | OUTPATIENT
Start: 2022-04-29 | End: 2022-04-29 | Stop reason: HOSPADM

## 2022-04-29 RX ORDER — LABETALOL HYDROCHLORIDE 5 MG/ML
5 INJECTION, SOLUTION INTRAVENOUS
Status: DISCONTINUED | OUTPATIENT
Start: 2022-04-29 | End: 2022-04-29 | Stop reason: HOSPADM

## 2022-04-29 RX ORDER — EPHEDRINE SULFATE 50 MG/ML
INJECTION, SOLUTION INTRAVENOUS AS NEEDED
Status: DISCONTINUED | OUTPATIENT
Start: 2022-04-29 | End: 2022-04-29 | Stop reason: SURG

## 2022-04-29 RX ORDER — BUPIVACAINE HYDROCHLORIDE AND EPINEPHRINE 5; 5 MG/ML; UG/ML
INJECTION, SOLUTION EPIDURAL; INTRACAUDAL; PERINEURAL AS NEEDED
Status: DISCONTINUED | OUTPATIENT
Start: 2022-04-29 | End: 2022-04-29 | Stop reason: HOSPADM

## 2022-04-29 RX ORDER — CHOLECALCIFEROL (VITAMIN D3) 125 MCG
5 CAPSULE ORAL NIGHTLY PRN
COMMUNITY

## 2022-04-29 RX ORDER — FAMOTIDINE 10 MG/ML
20 INJECTION, SOLUTION INTRAVENOUS ONCE
Status: COMPLETED | OUTPATIENT
Start: 2022-04-29 | End: 2022-04-29

## 2022-04-29 RX ORDER — SODIUM CHLORIDE 0.9 % (FLUSH) 0.9 %
3 SYRINGE (ML) INJECTION EVERY 12 HOURS SCHEDULED
Status: DISCONTINUED | OUTPATIENT
Start: 2022-04-29 | End: 2022-04-29 | Stop reason: HOSPADM

## 2022-04-29 RX ORDER — EPHEDRINE SULFATE 50 MG/ML
5 INJECTION, SOLUTION INTRAVENOUS ONCE AS NEEDED
Status: DISCONTINUED | OUTPATIENT
Start: 2022-04-29 | End: 2022-04-29 | Stop reason: HOSPADM

## 2022-04-29 RX ORDER — DIPHENHYDRAMINE HYDROCHLORIDE 50 MG/ML
12.5 INJECTION INTRAMUSCULAR; INTRAVENOUS
Status: DISCONTINUED | OUTPATIENT
Start: 2022-04-29 | End: 2022-04-29 | Stop reason: HOSPADM

## 2022-04-29 RX ORDER — HYDRALAZINE HYDROCHLORIDE 20 MG/ML
5 INJECTION INTRAMUSCULAR; INTRAVENOUS
Status: DISCONTINUED | OUTPATIENT
Start: 2022-04-29 | End: 2022-04-29 | Stop reason: HOSPADM

## 2022-04-29 RX ORDER — FLUMAZENIL 0.1 MG/ML
0.2 INJECTION INTRAVENOUS AS NEEDED
Status: DISCONTINUED | OUTPATIENT
Start: 2022-04-29 | End: 2022-04-29 | Stop reason: HOSPADM

## 2022-04-29 RX ORDER — HYDROCODONE BITARTRATE AND ACETAMINOPHEN 7.5; 325 MG/1; MG/1
TABLET ORAL
Qty: 30 TABLET | Refills: 0 | Status: SHIPPED | OUTPATIENT
Start: 2022-04-29

## 2022-04-29 RX ORDER — LIDOCAINE HYDROCHLORIDE 20 MG/ML
INJECTION, SOLUTION INFILTRATION; PERINEURAL AS NEEDED
Status: DISCONTINUED | OUTPATIENT
Start: 2022-04-29 | End: 2022-04-29 | Stop reason: SURG

## 2022-04-29 RX ORDER — FENTANYL CITRATE 50 UG/ML
INJECTION, SOLUTION INTRAMUSCULAR; INTRAVENOUS AS NEEDED
Status: DISCONTINUED | OUTPATIENT
Start: 2022-04-29 | End: 2022-04-29 | Stop reason: SURG

## 2022-04-29 RX ADMIN — FAMOTIDINE 20 MG: 10 INJECTION INTRAVENOUS at 12:31

## 2022-04-29 RX ADMIN — CEFAZOLIN SODIUM 2 G: 2 INJECTION, SOLUTION INTRAVENOUS at 12:28

## 2022-04-29 RX ADMIN — DEXAMETHASONE SODIUM PHOSPHATE 8 MG: 10 INJECTION INTRAMUSCULAR; INTRAVENOUS at 12:50

## 2022-04-29 RX ADMIN — LIDOCAINE HYDROCHLORIDE 80 MG: 20 INJECTION, SOLUTION INFILTRATION; PERINEURAL at 12:44

## 2022-04-29 RX ADMIN — ONDANSETRON 4 MG: 2 INJECTION INTRAMUSCULAR; INTRAVENOUS at 13:41

## 2022-04-29 RX ADMIN — MIDAZOLAM 1 MG: 1 INJECTION INTRAMUSCULAR; INTRAVENOUS at 12:31

## 2022-04-29 RX ADMIN — PROPOFOL 150 MG: 10 INJECTION, EMULSION INTRAVENOUS at 12:44

## 2022-04-29 RX ADMIN — FENTANYL CITRATE 50 MCG: 50 INJECTION, SOLUTION INTRAMUSCULAR; INTRAVENOUS at 14:35

## 2022-04-29 RX ADMIN — SODIUM CHLORIDE, POTASSIUM CHLORIDE, SODIUM LACTATE AND CALCIUM CHLORIDE: 600; 310; 30; 20 INJECTION, SOLUTION INTRAVENOUS at 11:03

## 2022-04-29 RX ADMIN — FENTANYL CITRATE 50 MCG: 50 INJECTION INTRAMUSCULAR; INTRAVENOUS at 13:42

## 2022-04-29 RX ADMIN — EPHEDRINE SULFATE 10 MG: 50 INJECTION INTRAVENOUS at 13:27

## 2022-04-29 RX ADMIN — HYDROCODONE BITARTRATE AND ACETAMINOPHEN 1 TABLET: 7.5; 325 TABLET ORAL at 14:18

## 2022-04-29 RX ADMIN — EPHEDRINE SULFATE 5 MG: 50 INJECTION INTRAVENOUS at 13:02

## 2022-04-29 RX ADMIN — KETOROLAC TROMETHAMINE 30 MG: 30 INJECTION, SOLUTION INTRAMUSCULAR at 13:36

## 2022-04-29 RX ADMIN — EPHEDRINE SULFATE 5 MG: 50 INJECTION INTRAVENOUS at 12:54

## 2022-04-29 RX ADMIN — FENTANYL CITRATE 50 MCG: 50 INJECTION INTRAMUSCULAR; INTRAVENOUS at 12:44

## 2022-04-29 RX ADMIN — EPHEDRINE SULFATE 5 MG: 50 INJECTION INTRAVENOUS at 13:04

## 2022-04-29 RX ADMIN — EPHEDRINE SULFATE 5 MG: 50 INJECTION INTRAVENOUS at 13:30

## 2022-04-29 RX ADMIN — EPHEDRINE SULFATE 5 MG: 50 INJECTION INTRAVENOUS at 12:58

## 2022-04-29 NOTE — ANESTHESIA PREPROCEDURE EVALUATION
Anesthesia Evaluation     Patient summary reviewed and Nursing notes reviewed   history of anesthetic complications: PONV  NPO Solid Status: > 8 hours  NPO Liquid Status: > 2 hours           Airway   Mallampati: II  Dental      Pulmonary    (+) recent URI,   Cardiovascular   Exercise tolerance: good (4-7 METS)    ECG reviewed      ROS comment: Sinus rhythm  Left axis deviation  NO PRIOR TRACING AVAILABLE FOR COMPARISON  Electronically Signed By: Marylu Aceves ([facility]) 27-Apr-2022 17:17:34    Neuro/Psych  (+) psychiatric history Anxiety,    GI/Hepatic/Renal/Endo    (+)  GERD,      Musculoskeletal     Abdominal    Substance History - negative use     OB/GYN negative ob/gyn ROS         Other   arthritis,                      Anesthesia Plan    ASA 2     general   (/92 (BP Location: Right arm, Patient Position: Sitting)   Pulse 75   Temp 36.4 °C (97.5 °F) (Oral)   Resp 18   SpO2 95%     Pt does not want a blood transfusion reports she's documented & signed paperwork preoperatively in regards to this.      I have reviewed the patient's history with the patient and the chart, including all pertinent laboratory results and imaging. I have explained the risks of anesthesia including but not limited to dental damage, corneal abrasion, nerve injury, MI, stroke, and death.    )          CODE STATUS:

## 2022-04-29 NOTE — ANESTHESIA POSTPROCEDURE EVALUATION
Patient: Sherrene H Mills    Procedure Summary     Date: 04/29/22 Room / Location:  [facility]  /  [facility]    Anesthesia Start: 1234 Anesthesia Stop: 1354    Procedure: LEFT KNEE ARTHROSCOPY partial medial meniscectomy, chondroplast of patella, and debridement (Left Knee) Diagnosis:       Acute medial meniscus tear of left knee, initial encounter      (Acute medial meniscus tear of left knee, initial encounter [S83.242A])    Surgeons: Babar Yarbrough MD Provider: Lewis Marino DO    Anesthesia Type: general ASA Status: 2          Anesthesia Type: general    Vitals  Vitals Value Taken Time   /95 04/29/22 1446   Temp 36.6 °C (97.8 °F) 04/29/22 1350   Pulse 75 04/29/22 1455   Resp 16 04/29/22 1445   SpO2 99 % 04/29/22 1456   Vitals shown include unvalidated device data.        Post Anesthesia Care and Evaluation    Patient location during evaluation: bedside  Patient participation: complete - patient participated  Level of consciousness: awake and alert  Pain management: adequate  Airway patency: patent  Anesthetic complications: No anesthetic complications  PONV Status: controlled  Cardiovascular status: acceptable and hemodynamically stable  Respiratory status: acceptable, spontaneous ventilation and nonlabored ventilation  Hydration status: acceptable    Comments: /85 (BP Location: Left arm, Patient Position: Lying)   Pulse 82   Temp 36.6 °C (97.8 °F)   Resp 16   SpO2 98%

## 2022-04-29 NOTE — ANESTHESIA PROCEDURE NOTES
Airway  Urgency: elective    Date/Time: 4/29/2022 12:45 PM  Airway not difficult    General Information and Staff    Patient location during procedure: OR  Anesthesiologist: Lewis Marino DO  CRNA/CAA: Melissa Harkins CRNA    Indications and Patient Condition  Indications for airway management: airway protection    Preoxygenated: yes  Mask difficulty assessment: 1 - vent by mask    Final Airway Details  Final airway type: supraglottic airway      Successful airway: classic  Size 4    Number of attempts at approach: 1  Assessment: lips, teeth, and gum same as pre-op    Additional Comments  PreO2, IV induction, easy mask, LMA placed w/o difficulty, cuff air placed, secured in placed, EBBSH, +etCO2, atraumatic, teeth and lips as preop.

## 2022-05-04 ENCOUNTER — TELEPHONE (OUTPATIENT)
Dept: ORTHOPEDIC SURGERY | Facility: CLINIC | Age: 67
End: 2022-05-04

## 2022-05-04 NOTE — TELEPHONE ENCOUNTER
Call returned to the patient.  Patient is having increasing pain.  Apparently she is not taking her pain medicine on a regular basis due to her concerns for constipation.  She does have several questions regarding her incision, dressing and activity.  Patient does live alone.  States that she is having difficulty putting weight on her foot.  Although in talking to her I have concerns that she is not as active as she should be due to her complaints of pain.  I discussed with her in detail that some of her discomfort is more of an inflammatory type response.  Would recommend that she take 600 mg of ibuprofen 3 times a day with food for the next 2 days to see if that will decrease some of her inflammation and discomfort.  She also is to continue with ice and elevation.  States that the incisions are intact without any erythema or drainage.  She has no swelling and remains afebrile.  We discussed on how to elevate her leg properly and to continue using ice to the knee.  Have advised her that she does need to begin working on some exercises especially straight leg raises and range of motion to the knee to avoid increased stiffness and pain.  She will need to continue using the walker with ambulation.  Have also recommended that she take Pepcid or some over-the-counter PPI to coat her stomach while on the NSAIDs.  Patient may use her pain medicine as needed.  We discussed taking stool softeners daily and to avoid letting her self go more than 2 to 3 days without having a BM.  If so she should take an over-the-counter laxative.  Have explained to the patient that a lot of her discomfort is related to her inactivity.  Have reassured her that when she begins working on range of motion and muscle strengthening as well as ambulation that her symptoms should improve.  She also had concerns about her follow-up appointment.  Have advised her that it is okay to leave the sutures in anywhere from 1 week to 3 weeks and her  appointment that is scheduled with [facility] is within an appropriate timeframe.  Patient was also concerned about her foot turning dark purple.  States that her family member works in the medical profession and told her that was abnormal.  After discussing with her her foot is only dark in color when it is in a dependent position but returns to normal color after elevation.  We discussed that this is related to venous congestion and would improve as she begins to ambulate more and bear weight.  Have left it open for her to call if she has any other questions or concerns.

## 2022-05-04 NOTE — TELEPHONE ENCOUNTER
----- Message from BRENDON Morse sent at 5/4/2022 11:07 AM EDT -----  Regarding: FW: Post Surgery     ----- Message -----  From: Babar Yarbrough MD  Sent: 5/4/2022   8:37 AM EDT  To: BRENDON Morse  Subject: FW: Post Surgery                                   ----- Message -----  From: Deidra Lopez  Sent: 5/4/2022   7:47 AM EDT  To: Babar Yarbrough MD  Subject: FW: Post Surgery                                   ----- Message -----  From: Sherrene H Mills  Sent: 5/3/2022   9:33 PM EDT  To: Siri Os [facility]  Subject: Post Surgery                                     Good afternoon Dr Yarbrough   I wanted to follow up regarding my after surgery post op instructions,  clarification as it states differs two things:  1) follow up with surgeon in one week   2) apt set with NP Linnea May 12 -9am   My daughter in law said she was told,   two weeks follow up.   And to clarify that apt is with your NP and not you?  Today day 4   I’m still unable to walk on it, using Walker, wasn’t sure what to expect time wise.   Instructions said, can bare tolerable weight. Walker or crutches ok.   Was not sure what to expect, do not want to mess up all that work you did by pushing myself, if I shouldn’t be trying to use it.   Unwrapped:  incisions looked good -no pink   One stitch was on outside, wasn’t sure if that’s norm. Hoping I didn’t pop it loose already.   My foot and few toes were little purple looking, my brother works in medical field advised me that should not be that way( it’s poor circulation- which I knew I already had) but keeping it propped up and iced has helped.   Should the bandages be changed daily?   Assume I should expect some discomfort pain and stinging at Incision site?  While waiting for follow up apt   Is there’s anything I should be doing to help my recovery,  Or just giving  it some time to heal   and not push it. (Like how I got myself in this mess pushing it too much -ha)     Again thank you  so much.

## 2022-05-06 ENCOUNTER — TELEPHONE (OUTPATIENT)
Dept: ORTHOPEDIC SURGERY | Facility: CLINIC | Age: 67
End: 2022-05-06

## 2022-05-06 NOTE — TELEPHONE ENCOUNTER
Pt requested a later time on 5/12 for her post op appt. I changed her appt time from 9am to 1030am and informed pt of this via [employer].

## 2022-05-12 ENCOUNTER — OFFICE VISIT (OUTPATIENT)
Dept: ORTHOPEDIC SURGERY | Facility: CLINIC | Age: 67
End: 2022-05-12

## 2022-05-12 VITALS — BODY MASS INDEX: 23.39 KG/M2 | WEIGHT: 132 LBS | TEMPERATURE: 98.9 F | RESPIRATION RATE: 18 BRPM | HEIGHT: 63 IN

## 2022-05-12 DIAGNOSIS — Z98.890 S/P ARTHROSCOPIC KNEE SURGERY: Primary | ICD-10-CM

## 2022-05-12 PROCEDURE — 99024 POSTOP FOLLOW-UP VISIT: CPT | Performed by: NURSE PRACTITIONER

## 2022-05-12 NOTE — PROGRESS NOTES
Sherrene H Mills : 1955 MRN: [id number] DATE: 2022  Body mass index is 23.38 kg/m².  Vitals:    22 1028   Resp: 18   Temp: 98.9 °F (37.2 °C)       DIAGNOSIS: 3 week follow up left knee arthroscopy     SUBJECTIVE:Patient returns today for follow up of knee arthroscopy. Patient reports doing well with no unusual complaints. Appears to be progressing appropriately.    OBJECTIVE:   Exam:.  The incision is healing appropriately. No sign of infection. Range of motion is progressing as expected. The calf is soft and nontender with a negative Homans sign. Moderate swelling and stiffness in operative knee. Distal pulse intact.     DIAGNOSTIC STUDIES Pictures from surgery were reviewed with the patient and questions were answered.    ASSESSMENT: status post knee arthroscopy expected healing.    PLAN: 1) Stitches removed and steri strips applied-ok to remove when they fall off.   2) Order given for PT    3) Continue ice PRN   4) Follow up in 5 weeks.        BRENDON Carter  2022

## 2022-06-01 ENCOUNTER — OFFICE VISIT (OUTPATIENT)
Dept: ORTHOPEDIC SURGERY | Facility: CLINIC | Age: 67
End: 2022-06-01

## 2022-06-01 ENCOUNTER — DOCUMENTATION (OUTPATIENT)
Dept: ORTHOPEDIC SURGERY | Facility: CLINIC | Age: 67
End: 2022-06-01

## 2022-06-01 VITALS — TEMPERATURE: 97.3 F | HEIGHT: 63 IN | BODY MASS INDEX: 23.39 KG/M2 | WEIGHT: 132 LBS

## 2022-06-01 DIAGNOSIS — Z98.890 S/P ARTHROSCOPIC KNEE SURGERY: Primary | ICD-10-CM

## 2022-06-01 PROCEDURE — 99024 POSTOP FOLLOW-UP VISIT: CPT | Performed by: ORTHOPAEDIC SURGERY

## 2022-06-01 NOTE — PROGRESS NOTES
Pt was on schedule today as needing p/o XR of the LT Knee. PT questioned the necessity, after review of her chart it was determined that no XR were needed. She is a p/o scope with XR last taken  03/2022. The 2V of the LT knee done today were sent to PACS, but PT was not charged for the images.

## 2022-06-01 NOTE — PROGRESS NOTES
"She is about a month and a half status post her left knee scope says she is doing very well.  With her physical therapist she says she is going from \"-1 225\".  She says she has an upcoming cataract operation she has been in need to get scheduled.  He is able to walk well in the office she has good range of motion no unusual swelling in her calf is soft.  Right knee which has bothered her a lot is not bothering her at all at this time.  Her postop advice we will see her back as needed  Answers for HPI/ROS submitted by the patient on 5/30/2022  Please describe your symptoms.: Follow up from surgery  Have you had these symptoms before?: Yes  How long have you been having these symptoms?: Greater than 2 weeks  Please list any medications you are currently taking for this condition.: .  Please describe any probable cause for these symptoms. : .  What is the primary reason for your visit?: Other      "

## 2022-06-02 ENCOUNTER — TELEPHONE (OUTPATIENT)
Dept: ORTHOPEDIC SURGERY | Facility: CLINIC | Age: 67
End: 2022-06-02

## 2022-06-02 NOTE — TELEPHONE ENCOUNTER
Patient came by the office today and is asking for an extension on her return to work note. She would like to remain off until 6/20/222

## 2022-06-07 ENCOUNTER — DOCUMENTATION (OUTPATIENT)
Dept: ORTHOPEDIC SURGERY | Facility: CLINIC | Age: 67
End: 2022-06-07

## 2022-06-08 ENCOUNTER — APPOINTMENT (OUTPATIENT)
Dept: OTHER | Facility: HOSPITAL | Age: 67
End: 2022-06-08

## 2022-06-08 ENCOUNTER — APPOINTMENT (OUTPATIENT)
Dept: GENERAL RADIOLOGY | Facility: HOSPITAL | Age: 67
End: 2022-06-08

## 2022-06-08 ENCOUNTER — HOSPITAL ENCOUNTER (EMERGENCY)
Facility: HOSPITAL | Age: 67
Discharge: HOME | End: 2022-06-08
Attending: EMERGENCY MEDICINE | Admitting: EMERGENCY MEDICINE

## 2022-06-08 VITALS
HEART RATE: 52 BPM | DIASTOLIC BLOOD PRESSURE: 86 MMHG | OXYGEN SATURATION: 96 % | TEMPERATURE: 96.6 F | RESPIRATION RATE: 16 BRPM | SYSTOLIC BLOOD PRESSURE: 130 MMHG

## 2022-06-08 DIAGNOSIS — S32.010A CLOSED COMPRESSION FRACTURE OF BODY OF L1 VERTEBRA: ICD-10-CM

## 2022-06-08 DIAGNOSIS — Z09 FOLLOW-UP EXAM: ICD-10-CM
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
## 2022-06-08 PROCEDURE — 72110 X-RAY EXAM L-2 SPINE 4/>VWS: CPT

## 2022-06-08 PROCEDURE — 99283 EMERGENCY DEPT VISIT LOW MDM: CPT

## 2022-06-08 RX ORDER — HYDROCODONE BITARTRATE AND ACETAMINOPHEN 5; 325 MG/1; MG/1
1 TABLET ORAL ONCE
Status: COMPLETED | OUTPATIENT
Start: 2022-06-08 | End: 2022-06-08

## 2022-06-08 RX ADMIN — HYDROCODONE BITARTRATE AND ACETAMINOPHEN 1 TABLET: 5; 325 TABLET ORAL at 18:44

## 2022-06-08 NOTE — ED PROVIDER NOTES
EMERGENCY DEPARTMENT ENCOUNTER    Room Number:  A03/03  Date of encounter:  2022  PCP: Elena Yip MD  Historian: patient   Full history not obtainable due to: none     HPI:  Chief Complaint: Back pain     Context: Sherrene H Mills is a 66 y.o. female who presents to the ED c/o back pain onset just pta. Reports she tripped and fell backward trying to close the door when a bird started flying around her basement. She landed on her back. No loc. No head injury. Now reports constant LBP. It is right sided. Non radiating. Worse with movement. Tried some ice and aleve without complete relief of the pain.     Not anticoagulated.  No hx of chronic backpain.     PAST MEDICAL HISTORY    Active Ambulatory Problems     Diagnosis Date Noted   • Viral upper respiratory tract infection 2022   • Acute medial meniscus tear of left knee 2022     Resolved Ambulatory Problems     Diagnosis Date Noted   • No Resolved Ambulatory Problems     Past Medical History:   Diagnosis Date   • Acute torn meniscus of knee, left, sequela    • Anxiety    • Arthritis    • Frequent UTI    • GERD (gastroesophageal reflux disease)    • H/O bladder infections    • Hearing loss    • History of COVID-19 2022   • History of dizziness    • Knee pain, left    • Laryngopharyngeal reflux (LPR)    • Lumbar disc disease    • Mal de debarquement    • Periodic heart flutter    • PONV (postoperative nausea and vomiting)    • Tear of medial meniscus of left knee    • Tinnitus aurium, bilateral    • Vestibular disorder          PAST SURGICAL HISTORY  Past Surgical History:   Procedure Laterality Date   • APPENDECTOMY N/A    • CARPAL TUNNEL RELEASE     •  SECTION N/A    • ENDOMETRIAL ABLATION N/A    • ENDOSCOPY AND COLONOSCOPY N/A 2012    Normal upper endoscopy and normal colon; Dr. Paresh Mata   • ENDOSCOPY AND COLONOSCOPY N/A 2002    Three seperate prepyloric ulcers, mild duodenitis, normal  remainder of stomach and esophagus, normal colonoscopy with excellent bowel prep; Dr. Paresh Mata   • EYE SURGERY  1961   • KNEE ARTHROSCOPY Left 4/29/2022    Procedure: LEFT KNEE ARTHROSCOPY partial medial meniscectomy, chondroplast of patella, and debridement;  Surgeon: Babar Yarbrough MD;  [place] [facility] [place] [facility] [place]  Service: [place]opedics;  Laterality: Left;   • KNEE ARTHROSCOPY W/ PARTIAL MEDIAL MENISCECTOMY Left 06/26/2006    Dr. Raudel De Guzman   • LAPAROSCOPIC CHOLECYSTECTOMY N/A 04/16/2012    Dr. Paresh Mata   • LUMBAR EPIDURAL INJECTION N/A    • OVARIAN CYST REMOVAL     • TONSILLECTOMY AND ADENOIDECTOMY Bilateral          FAMILY HISTORY  Family History   Problem Relation Age of Onset   • Hypertension Mother    • Arthritis Mother    • Cancer Father         Bladder   • Heart disease Father    • Arthritis Father    • Hypertension Father    • Malig Hyperthermia Neg Hx          SOCIAL HISTORY  Social History     Socioeconomic History   • Marital status:    Tobacco Use   • Smoking status: Never Smoker   • Smokeless tobacco: Never Used   Vaping Use   • Vaping Use: Never used   Substance and Sexual Activity   • Alcohol use: Yes     Alcohol/week: 0.0 standard drinks     Comment: Occasional   • Drug use: Never   • Sexual activity: Not Currently     Birth control/protection: Post-menopausal         ALLERGIES  Codeine, Percocet [oxycodone-acetaminophen], and Sulfa antibiotics        REVIEW OF SYSTEMS  Review of Systems   All systems reviewed and marked as negative except as listed in HPI       PHYSICAL EXAM    I have reviewed the triage vital signs and nursing notes.    ED Triage Vitals   Temp Heart Rate Resp BP SpO2   06/08/22 1557 06/08/22 1558 06/08/22 1558 06/08/22 1558 06/08/22 1558   96.6 °F (35.9 °C) 64 17 (!) 169/113 96 %      Temp src Heart Rate Source Patient Position BP Location FiO2 (%)   06/08/22 1557 -- -- -- --   Tympanic           GENERAL: Alert well developed, well nourished in no  distress  HENT: NCAT, neck supple, trachea midline  EYES: no scleral icterus, PERRL, normal conjunctivae  CV: regular rhythm, regular rate, no murmur  RESPIRATORY: unlabored effort, CTAB  ABDOMEN: soft, nontender, nondistended, bowel sounds present  MUSCULOSKELETAL: No gross deformity. BLE strength intact. No foot drop. Right lumbar paraspinous tenderness. No focal vertebral tenderness.   NEURO: alert,  sensory and motor function of extremities grossly intact, speech clear, mental status normal/baseline  SKIN: warm, dry, no rash  PSYCH:  Appropriate mood and affect    Vital signs and nursing notes reviewed.        RADIOLOGY  XR Outside Films    Result Date: 6/8/2022  This procedure was auto-finalized with no dictation required.    XR Outside Films    Result Date: 6/8/2022  This procedure was auto-finalized with no dictation required.    XR Spine Lumbar Complete 4+VW    Result Date: 6/8/2022  LUMBAR SPINE COMPLETE  HISTORY: Pain after a fall.  COMPARISON: None.  FINDINGS: AP, lateral and bilateral oblique views of the lumbar spine demonstrates mild loss of vertical height anteriorly at L1. This is age indeterminant and may represent a remote compression deformity.  The prior examinations from [facility] requested. These were performed on 10/19/2021. The loss of vertical height anteriorly at L1 is new versus prior examination. Further evaluation could be performed with a MRI examination of the lumbar spine.  The above information was called to and discussed with Jannie Mcnally.  This report was finalized on 6/8/2022 8:29 PM by Dr. Babar Massey M.D.        I ordered the above noted radiological studies. Independently reviewed by me and discussed with radiologist.  See dictation above for official radiology interpretation.      PROCEDURES    Procedures        MEDICATIONS GIVEN IN ER    Medications   HYDROcodone-acetaminophen (NORCO) 5-325 MG per tablet 1 tablet (1 tablet Oral Given 6/8/22 5241)         PROGRESS,  DATA ANALYSIS, CONSULTS, AND MEDICAL DECISION MAKING    All labs have been independently reviewed by me.  All radiology studies have been reviewed by me.   EKG's independently reviewed by me.  Discussion below represents my analysis of pertinent findings related to patient's condition, differential diagnosis, treatment plan and final disposition.    DIFFERENTIAL DIAGNOSIS INCLUDE BUT NOT LIMITED TO: Lumbago, muscle spasm, vertebral fracture, spinal stenosis, lumbar radiculopathy, cauda equina syndrome, DDD, AAA, retroperitoneal hematoma, SBO, diverticulitis, UTI    MDM: Pt presents to ED s/p fall. XR shows age indeterminate compression fx. She has no radicular symptoms. No bowel or bladder complaints. She is ambulatory. She will be referred to [facility] for follow up and likely MRI. She also has an orthopedic who just did her knee surgery commended she call the office as some orthopedics will evaluate this type of injury. Return precautions given.        ED Course    Wed Jun 08, 2022   1920 I viewed xr lumbar spine on pacs.  [JS]      ED Course User Index  [JS] Jannie Mcnally APRN           BP - 130/86  HR - 52  TEMP - 96.6 °F (35.9 °C) (Tympanic)  O2 SATS - 96%        DIAGNOSIS  Final diagnoses:   Fall from standing, initial encounter   Closed compression fracture of body of L1 vertebra (HCC)         DISPOSITION  Discharge     Pt masked in first look. I wore appropriate PPE throughout my encounters with the pt. I performed hand hygiene on entry into the pt room and upon exit.     Dictated utilizing Dragon dictation:  Much of this encounter note is an electronic transcription/translation of spoken language to printed text.      Jannie Mcnally APRN  06/09/22 1114

## 2022-06-08 NOTE — ED TRIAGE NOTES
C/O mid to lower back pain S/P mechanical fall today and fell straight back.     Mask placed on patient in triage. Triage staff wore appropriate PPE during interaction with patient.

## 2022-06-08 NOTE — ED PROVIDER NOTES
MD ATTESTATION NOTE    The LARRY and I have discussed this patient's history, physical exam, and treatment plan.  I have reviewed the documentation and personally had a face to face interaction with the patient. I affirm the documentation and agree with the treatment and plan.  The attached note describes my personal findings.      I provided a substantive portion of the care of the patient.  I personally performed the physical exam in its entirety, and below are my findings.  For this patient encounter, the patient wore surgical mask, I wore full protective PPE including N95 and eye protection    Brief HPI: 66-year-old female who fell backwards in her basement landing on her back today.  The patient denies head injury or loss of consciousness but can complains of mid back pain since that time.  She states the pain is worse with movement.  Patient has no neurological complaints.    General : 86-year-old patient is awake alert and oriented  HEENT: NCAT  CV: Heart is regular with no murmurs  Respiratory: CTA bilaterally  Abd: Soft and nontender  Ext: No acute abnormalities  Skin: No rash  Back: Point tenderness to lower T/L junction.  Neuro: Cranial nerves II through XII grossly intact as tested.  No acute lateralizing deficits.  Psych: Normal mood and affect      Plan: Obtain imaging of the patient's back  Patient's imaging shows an L1 compression fracture.  Currently the patient is tolerating her pain well and is neurologically intact.  She is stable for discharge with pain control and outpatient follow-up for further evaluation of her L1 compression fracture.     David Lezama MD  06/08/22 8390

## 2022-06-09 ENCOUNTER — TELEPHONE (OUTPATIENT)
Dept: ORTHOPEDIC SURGERY | Facility: CLINIC | Age: 67
End: 2022-06-09

## 2022-06-09 NOTE — TELEPHONE ENCOUNTER
Caller: Mills, Sherrene H    Relationship: Self    Best call back number: [phone]    What orders are you requesting (i.e. lab or imaging): REFERRAL TO DR BALDERAS - PATIENT FELL LAST NIGHT WENT TO ER-  ER - PATIENT HAS CLOSED COMPRESSION FRACTURE OF BODY OF L1 VERTEBRA -     In what timeframe would the patient need to come in: ASAP    Where will you receive your lab/imaging services: PREFER DR BALDERAS    Additional notes: PLEASE CALL AND ADVISE PATIENT. TY

## 2022-06-09 NOTE — DISCHARGE INSTRUCTIONS
Follow up with your doctor or spine surgery for compression fracture  Return if worse or new concerns   Have outpatient MRI for further evaluation of your spine.  Ice to sore areas.   Home to rest.  Continue home pain medication as needed.  Use sparingly.  Continue care with your primary care physician and have your blood pressure regularly checked and managed. Normal blood pressure is 120/80.

## 2022-06-09 NOTE — TELEPHONE ENCOUNTER
Spoke with patient and she is aware that you are out of town until 6/13/22. When can you work patient in.

## 2022-06-13 NOTE — TELEPHONE ENCOUNTER
Margareth Dean does not have a schedule at this time and not sure when it will open up.  Please advise.

## 2024-05-15 ENCOUNTER — OFFICE VISIT (OUTPATIENT)
Dept: SURGERY | Facility: CLINIC | Age: 69
End: 2024-05-15
Payer: MEDICARE

## 2024-05-15 VITALS
WEIGHT: 136.6 LBS | DIASTOLIC BLOOD PRESSURE: 75 MMHG | BODY MASS INDEX: 25.14 KG/M2 | HEIGHT: 62 IN | SYSTOLIC BLOOD PRESSURE: 128 MMHG

## 2024-05-15 DIAGNOSIS — R10.13 EPIGASTRIC ABDOMINAL PAIN: Primary | ICD-10-CM

## 2024-05-15 DIAGNOSIS — Z12.11 SCREEN FOR COLON CANCER: ICD-10-CM

## 2024-05-15 DIAGNOSIS — K21.9 GASTROESOPHAGEAL REFLUX DISEASE, UNSPECIFIED WHETHER ESOPHAGITIS PRESENT: ICD-10-CM

## 2024-05-15 RX ORDER — OXYBUTYNIN CHLORIDE 10 MG/1
10 TABLET, EXTENDED RELEASE ORAL DAILY
COMMUNITY
Start: 2024-05-09

## 2024-05-16 NOTE — H&P (VIEW-ONLY)
ASSESSMENT/PLAN:    68-year-old lady who is due for routine screening colonoscopy who has breakthrough episodes of reflux and epigastric pain despite omeprazole use.  As result I am recommending adding an EGD to her colonoscopy.  Risks and rationale were discussed with her with risk including but not limited to bleeding, infection, bowel perforation and the need for additional procedures.  Any additional recommendations will be given once the results been reviewed.  All questions were answered and she was willing to proceed with all recommendations.    CC:     Reflux and need for screening colonoscopy    HPI:    This is a 68-year-old lady presenting to the office today as a self-referral.  She is due for routine screening colonoscopy as her last one was in  which was normal.  She has had an increased amount of reflux over recent despite being on omeprazole.  She does occasionally also experienced epigastric stabbing pain as well.  She denies having nausea, vomiting, abdominal distention, dark tarry stools or bright red blood with her bowel movements.    ENDOSCOPY:   Colonoscopy  normal  EGD  history of ulcers and duodenitis    SOCIAL HISTORY:   Denies tobacco use  Denies alcohol use    FAMILY HISTORY:    Colorectal cancer: None    PREVIOUS ABDOMINAL SURGERY    Cholecystectomy 2012  Appendectomy      OTHER SURGERY  Past Surgical History:   Procedure Laterality Date    APPENDECTOMY N/A     CARPAL TUNNEL RELEASE       SECTION N/A     ENDOMETRIAL ABLATION N/A     ENDOSCOPY AND COLONOSCOPY N/A 2012    Normal upper endoscopy and normal colon; Dr. Paresh Mata    ENDOSCOPY AND COLONOSCOPY N/A 2002    Three seperate prepyloric ulcers, mild duodenitis, normal remainder of stomach and esophagus, normal colonoscopy with excellent bowel prep; Dr. Paresh Mata    EYE SURGERY      KNEE ARTHROSCOPY Left 2022    Procedure: LEFT KNEE ARTHROSCOPY partial medial  meniscectomy, chondroplast of patella, and debridement;  Surgeon: Babar Yarbrough MD;  [place] [facility] [place] [facility] [place]  Service: [place]opedics;  Laterality: Left;    KNEE ARTHROSCOPY W/ PARTIAL MEDIAL MENISCECTOMY Left 06/26/2006    Dr. Raudel De Guzman    LAPAROSCOPIC CHOLECYSTECTOMY N/A 04/16/2012    Dr. Paresh Mata    LUMBAR EPIDURAL INJECTION N/A     OVARIAN CYST REMOVAL      TONSILLECTOMY AND ADENOIDECTOMY Bilateral        PAST MEDICAL HISTORY:    Past Medical History:   Diagnosis Date    Acute torn meniscus of knee, left, sequela     Anxiety     Arthritis     Fissure, anal 2022    Seen Dr BAUM    Frequent UTI     GERD (gastroesophageal reflux disease)     H/O bladder infections     Hearing loss     History of COVID-19 01/22/2022    History of dizziness     Knee pain, left     Laryngopharyngeal reflux (LPR)     Lumbar disc disease     Mal de debarquement     Periodic heart flutter     PONV (postoperative nausea and vomiting)     Rectal bleeding 2022    Seen Dr BAUM hemroid    Tear of medial meniscus of left knee     Tinnitus aurium, bilateral     Vestibular disorder     DIZZINESS AND NAUSEA       MEDICATIONS:     Current Outpatient Medications:     buPROPion XL (WELLBUTRIN XL) 150 MG 24 hr tablet, Take 1 tablet by mouth Every Morning., Disp: , Rfl:     Cholecalciferol (VITAMIN D3 PO), Take 1 mg by mouth Daily. HOLDING FOR SURGERY, Disp: , Rfl:     fluticasone (FLONASE) 50 MCG/ACT nasal spray, 2 sprays into the nostril(s) as directed by provider Daily., Disp: , Rfl:     MAGNESIUM PO, Take 400 mg by mouth At Night As Needed. HOLD FOR SURGERY, Disp: , Rfl:     melatonin 5 MG tablet tablet, Take 1 tablet by mouth At Night As Needed., Disp: , Rfl:     NON FORMULARY, ROLL-ON CBD GEL WITH PEPPERMINT OIL. USES AS NEEDED FOR KNEE PAIN  HOLD FOR SURGERY, Disp: , Rfl:     omeprazole (priLOSEC) 40 MG capsule, Take 1 capsule by mouth Daily., Disp: , Rfl:     ondansetron (ZOFRAN) 4 MG tablet, Take 1 tablet by mouth As Needed., Disp:  ", Rfl:     oxybutynin XL (DITROPAN-XL) 10 MG 24 hr tablet, Take 1 tablet by mouth Daily., Disp: , Rfl:     nitrofurantoin, macrocrystal-monohydrate, (MACROBID) 100 MG capsule, Take 100 mg by mouth As Needed. FOR UTI (Patient not taking: Reported on 5/15/2024), Disp: , Rfl:     ALLERGIES:   Allergies   Allergen Reactions    Codeine Anaphylaxis, Hives and Shortness Of Breath    Percocet [Oxycodone-Acetaminophen] Nausea And Vomiting    Sulfa Antibiotics Rash       PHYSICAL EXAM:   Constitutional: Well-developed well-nourished, no acute distress  Vital signs:   Height 62\"  Weight 136  BMI 25  Neck: Supple, no palpable mass, trachea midline  Respiratory: Clear to auscultation, normal inspiratory effort  Cardiovascular: Regular rate, no murmur, no carotid bruit  Gastrointestinal: Soft, nontender  Psychiatric: Alert and oriented ×3, normal affect   BMI is within normal parameters. No other follow-up for BMI required.      Diego Dugan PA-C    [facility] - General Surgery  [address], [address]  [place] [address]    [address] [place] [address], [address]  [place] [address]    Office: [phone]  Fax: [phone]     "

## 2024-05-16 NOTE — PROGRESS NOTES
ASSESSMENT/PLAN:    68-year-old lady who is due for routine screening colonoscopy who has breakthrough episodes of reflux and epigastric pain despite omeprazole use.  As result I am recommending adding an EGD to her colonoscopy.  Risks and rationale were discussed with her with risk including but not limited to bleeding, infection, bowel perforation and the need for additional procedures.  Any additional recommendations will be given once the results been reviewed.  All questions were answered and she was willing to proceed with all recommendations.    CC:     Reflux and need for screening colonoscopy    HPI:    This is a 68-year-old lady presenting to the office today as a self-referral.  She is due for routine screening colonoscopy as her last one was in  which was normal.  She has had an increased amount of reflux over recent despite being on omeprazole.  She does occasionally also experienced epigastric stabbing pain as well.  She denies having nausea, vomiting, abdominal distention, dark tarry stools or bright red blood with her bowel movements.    ENDOSCOPY:   Colonoscopy  normal  EGD  history of ulcers and duodenitis    SOCIAL HISTORY:   Denies tobacco use  Denies alcohol use    FAMILY HISTORY:    Colorectal cancer: None    PREVIOUS ABDOMINAL SURGERY    Cholecystectomy 2012  Appendectomy      OTHER SURGERY  Past Surgical History:   Procedure Laterality Date    APPENDECTOMY N/A     CARPAL TUNNEL RELEASE       SECTION N/A     ENDOMETRIAL ABLATION N/A     ENDOSCOPY AND COLONOSCOPY N/A 2012    Normal upper endoscopy and normal colon; Dr. Paresh Mata    ENDOSCOPY AND COLONOSCOPY N/A 2002    Three seperate prepyloric ulcers, mild duodenitis, normal remainder of stomach and esophagus, normal colonoscopy with excellent bowel prep; Dr. Paresh Mata    EYE SURGERY      KNEE ARTHROSCOPY Left 2022    Procedure: LEFT KNEE ARTHROSCOPY partial medial  meniscectomy, chondroplast of patella, and debridement;  Surgeon: Babar Yarbrough MD;  [place] [facility] [place] [facility] [place]  Service: [place];  Laterality: Left;    KNEE ARTHROSCOPY W/ PARTIAL MEDIAL MENISCECTOMY Left 06/26/2006    Dr. Raudel De Guzman    LAPAROSCOPIC CHOLECYSTECTOMY N/A 04/16/2012    Dr. Paresh Mata    LUMBAR EPIDURAL INJECTION N/A     OVARIAN CYST REMOVAL      TONSILLECTOMY AND ADENOIDECTOMY Bilateral        PAST MEDICAL HISTORY:    Past Medical History:   Diagnosis Date    Acute torn meniscus of knee, left, sequela     Anxiety     Arthritis     Fissure, anal 2022    Seen Dr BAUM    Frequent UTI     GERD (gastroesophageal reflux disease)     H/O bladder infections     Hearing loss     History of COVID-19 01/22/2022    History of dizziness     Knee pain, left     Laryngopharyngeal reflux (LPR)     Lumbar disc disease     Mal de debarquement     Periodic heart flutter     PONV (postoperative nausea and vomiting)     Rectal bleeding 2022    Seen Dr BAUM hemroid    Tear of medial meniscus of left knee     Tinnitus aurium, bilateral     Vestibular disorder     DIZZINESS AND NAUSEA       MEDICATIONS:     Current Outpatient Medications:     buPROPion XL (WELLBUTRIN XL) 150 MG 24 hr tablet, Take 1 tablet by mouth Every Morning., Disp: , Rfl:     Cholecalciferol (VITAMIN D3 PO), Take 1 mg by mouth Daily. HOLDING FOR SURGERY, Disp: , Rfl:     fluticasone (FLONASE) 50 MCG/ACT nasal spray, 2 sprays into the nostril(s) as directed by provider Daily., Disp: , Rfl:     MAGNESIUM PO, Take 400 mg by mouth At Night As Needed. HOLD FOR SURGERY, Disp: , Rfl:     melatonin 5 MG tablet tablet, Take 1 tablet by mouth At Night As Needed., Disp: , Rfl:     NON FORMULARY, ROLL-ON CBD GEL WITH PEPPERMINT OIL. USES AS NEEDED FOR KNEE PAIN  HOLD FOR SURGERY, Disp: , Rfl:     omeprazole (priLOSEC) 40 MG capsule, Take 1 capsule by mouth Daily., Disp: , Rfl:     ondansetron (ZOFRAN) 4 MG tablet, Take 1 tablet by mouth As Needed., Disp:  ", Rfl:     oxybutynin XL (DITROPAN-XL) 10 MG 24 hr tablet, Take 1 tablet by mouth Daily., Disp: , Rfl:     nitrofurantoin, macrocrystal-monohydrate, (MACROBID) 100 MG capsule, Take 100 mg by mouth As Needed. FOR UTI (Patient not taking: Reported on 5/15/2024), Disp: , Rfl:     ALLERGIES:   Allergies   Allergen Reactions    Codeine Anaphylaxis, Hives and Shortness Of Breath    Percocet [Oxycodone-Acetaminophen] Nausea And Vomiting    Sulfa Antibiotics Rash       PHYSICAL EXAM:   Constitutional: Well-developed well-nourished, no acute distress  Vital signs:   Height 62\"  Weight 136  BMI 25  Neck: Supple, no palpable mass, trachea midline  Respiratory: Clear to auscultation, normal inspiratory effort  Cardiovascular: Regular rate, no murmur, no carotid bruit  Gastrointestinal: Soft, nontender  Psychiatric: Alert and oriented ×3, normal affect   BMI is within normal parameters. No other follow-up for BMI required.      Diego Dugan PA-C    [facility] - General Surgery  [address], [address]  [place] [address]    [address] [place] [address], [address]  [place] [address]    Office: [phone]  Fax: [phone]     "

## 2024-06-12 ENCOUNTER — HOSPITAL ENCOUNTER (OUTPATIENT)
Facility: HOSPITAL | Age: 69
Setting detail: HOSPITAL OUTPATIENT SURGERY
Discharge: HOME | End: 2024-06-12
Attending: SURGERY | Admitting: SURGERY
Payer: MEDICARE

## 2024-06-12 ENCOUNTER — ANESTHESIA EVENT (OUTPATIENT)
Dept: GASTROENTEROLOGY | Facility: HOSPITAL | Age: 69
End: 2024-06-12
Payer: MEDICARE

## 2024-06-12 ENCOUNTER — ANESTHESIA (OUTPATIENT)
Dept: GASTROENTEROLOGY | Facility: HOSPITAL | Age: 69
End: 2024-06-12
Payer: MEDICARE

## 2024-06-12 VITALS
RESPIRATION RATE: 14 BRPM | BODY MASS INDEX: 23.96 KG/M2 | WEIGHT: 135.2 LBS | HEIGHT: 63 IN | SYSTOLIC BLOOD PRESSURE: 98 MMHG | HEART RATE: 89 BPM | DIASTOLIC BLOOD PRESSURE: 61 MMHG | OXYGEN SATURATION: 98 %

## 2024-06-12 PROCEDURE — 25010000002 GLYCOPYRROLATE 0.2 MG/ML SOLUTION: Performed by: ANESTHESIOLOGY

## 2024-06-12 PROCEDURE — 25810000003 LACTATED RINGERS PER 1000 ML: Performed by: SURGERY

## 2024-06-12 PROCEDURE — G0121 COLON CA SCRN NOT HI RSK IND: HCPCS | Performed by: SURGERY

## 2024-06-12 PROCEDURE — 25010000002 PROPOFOL 1000 MG/100ML EMULSION: Performed by: ANESTHESIOLOGY

## 2024-06-12 PROCEDURE — 43235 EGD DIAGNOSTIC BRUSH WASH: CPT | Performed by: SURGERY

## 2024-06-12 RX ORDER — SODIUM CHLORIDE 9 MG/ML
40 INJECTION, SOLUTION INTRAVENOUS AS NEEDED
Status: DISCONTINUED | OUTPATIENT
Start: 2024-06-12 | End: 2024-06-12 | Stop reason: HOSPADM

## 2024-06-12 RX ORDER — SODIUM CHLORIDE 0.9 % (FLUSH) 0.9 %
10 SYRINGE (ML) INJECTION AS NEEDED
Status: DISCONTINUED | OUTPATIENT
Start: 2024-06-12 | End: 2024-06-12 | Stop reason: HOSPADM

## 2024-06-12 RX ORDER — GLYCOPYRROLATE 0.2 MG/ML
INJECTION INTRAMUSCULAR; INTRAVENOUS AS NEEDED
Status: DISCONTINUED | OUTPATIENT
Start: 2024-06-12 | End: 2024-06-12 | Stop reason: SURG

## 2024-06-12 RX ORDER — SODIUM CHLORIDE, SODIUM LACTATE, POTASSIUM CHLORIDE, CALCIUM CHLORIDE 600; 310; 30; 20 MG/100ML; MG/100ML; MG/100ML; MG/100ML
30 INJECTION, SOLUTION INTRAVENOUS CONTINUOUS PRN
Status: DISCONTINUED | OUTPATIENT
Start: 2024-06-12 | End: 2024-06-12 | Stop reason: HOSPADM

## 2024-06-12 RX ORDER — PROPOFOL 10 MG/ML
INJECTION, EMULSION INTRAVENOUS AS NEEDED
Status: DISCONTINUED | OUTPATIENT
Start: 2024-06-12 | End: 2024-06-12 | Stop reason: SURG

## 2024-06-12 RX ORDER — SODIUM CHLORIDE 0.9 % (FLUSH) 0.9 %
10 SYRINGE (ML) INJECTION EVERY 12 HOURS SCHEDULED
Status: DISCONTINUED | OUTPATIENT
Start: 2024-06-12 | End: 2024-06-12 | Stop reason: HOSPADM

## 2024-06-12 RX ORDER — LIDOCAINE HYDROCHLORIDE 20 MG/ML
INJECTION, SOLUTION INFILTRATION; PERINEURAL AS NEEDED
Status: DISCONTINUED | OUTPATIENT
Start: 2024-06-12 | End: 2024-06-12 | Stop reason: SURG

## 2024-06-12 RX ADMIN — SODIUM CHLORIDE, POTASSIUM CHLORIDE, SODIUM LACTATE AND CALCIUM CHLORIDE 30 ML/HR: 600; 310; 30; 20 INJECTION, SOLUTION INTRAVENOUS at 10:46

## 2024-06-12 RX ADMIN — LIDOCAINE HYDROCHLORIDE 100 MG: 20 INJECTION, SOLUTION INFILTRATION; PERINEURAL at 11:33

## 2024-06-12 RX ADMIN — PROPOFOL INJECTABLE EMULSION 330 MG: 10 INJECTION, EMULSION INTRAVENOUS at 11:35

## 2024-06-12 RX ADMIN — GLYCOPYRROLATE 0.2 MG: 0.2 INJECTION INTRAMUSCULAR; INTRAVENOUS at 11:33

## 2024-06-12 NOTE — ANESTHESIA PREPROCEDURE EVALUATION
Anesthesia Evaluation     Patient summary reviewed and Nursing notes reviewed   history of anesthetic complications:  PONV  NPO Solid Status: > 8 hours             Airway   Mallampati: II  Dental      Pulmonary    (+) ,recent URI  Cardiovascular   Exercise tolerance: good (4-7 METS)    ECG reviewed      ROS comment: Sinus rhythm  Left axis deviation  NO PRIOR TRACING AVAILABLE FOR COMPARISON  Electronically Signed By: Marylu Aceves ([facility]) 27-Apr-2022 17:17:34    Neuro/Psych  (+) psychiatric history Anxiety  GI/Hepatic/Renal/Endo    (+) GERD    Musculoskeletal     Abdominal    Substance History - negative use     OB/GYN negative ob/gyn ROS         Other   arthritis,                       Anesthesia Plan    ASA 2     MAC     (    I have reviewed the patient's history with the patient and the chart, including all pertinent laboratory results and imaging. I have explained the risks of anesthesia including but not limited to dental damage, corneal abrasion, nerve injury, MI, stroke, and death.    )    Anesthetic plan, risks, benefits, and alternatives have been provided, discussed and informed consent has been obtained with: patient.        CODE STATUS:

## 2024-06-12 NOTE — ANESTHESIA POSTPROCEDURE EVALUATION
"Patient: Sherrene H Mills    Procedure Summary       Date: 06/12/24 Room / Location:  [facility]    Anesthesia Start: 1130 Anesthesia Stop: 1159    Procedures:       COLONOSCOPY to cecum      ESOPHAGOGASTRODUODENOSCOPY (Esophagus) Diagnosis:       Gastroesophageal reflux disease, unspecified whether esophagitis present      Epigastric abdominal pain      Screen for colon cancer      (Gastroesophageal reflux disease, unspecified whether esophagitis present [K21.9])      (Epigastric abdominal pain [R10.13])      (Screen for colon cancer [Z12.11])    Surgeons: Paresh Mata MD Provider: Justin Brown MD    Anesthesia Type: MAC ASA Status: 2            Anesthesia Type: MAC    Vitals  Vitals Value Taken Time   BP 98/61 06/12/24 1223   Temp     Pulse 90 06/12/24 1225   Resp 14 06/12/24 1223   SpO2 98 % 06/12/24 1225   Vitals shown include unfiled device data.        Post Anesthesia Care and Evaluation    Patient location during evaluation: bedside  Patient participation: complete - patient participated  Level of consciousness: awake and alert  Pain management: adequate    Airway patency: patent  Anesthetic complications: No anesthetic complications    Cardiovascular status: acceptable  Respiratory status: acceptable  Hydration status: acceptable    Comments: BP 98/61   Pulse 89   Resp 14   Ht 160 cm (63\")   Wt 61.3 kg (135 lb 3.2 oz)   SpO2 98%   BMI 23.95 kg/m²     "

## 2024-06-12 NOTE — DISCHARGE INSTRUCTIONS
For the next 24 hours patient needs to be with a responsible adult.    For 24 hours DO NOT drive, operate machinery, appliances, drink alcohol, make important decisions or sign legal documents.    Start with a light or bland diet if you are feeling sick to your stomach otherwise advance to regular diet as tolerated.    Follow recommendations on procedure report if provided by your doctor.    Call Dr Mata for problems [phone]    Problems may include but not limited to: large amounts of bleeding, trouble breathing, repeated vomiting, severe unrelieved pain, fever or chills.

## 2024-06-12 NOTE — OP NOTE
PREOPERATIVE DIAGNOSIS:  GERD  Screening    POSTOPERATIVE DIAGNOSIS AND FINDINGS:  Normal upper endoscopy  Diverticulosis    PROCEDURE:  EGD  Colonoscopy to cecum     SURGEON:  Paresh Mata MD    ANESTHESIA:  MAC    SPECIMEN(S):  none    DESCRIPTION:  In decubitus position endoscope was inserted into the esophagus, stomach and duodenum. Antegrade and retroflex view of stomach performed.  First, second and third portions of duodenum were normal.  Gastric antrum, body, and retroflexed view of fundus were normal.  GE junction and esophagus were normal with exception of minimal sliding hiatal hernia.    Digital rectal exam was normal. Colonoscope inserted under direct visualization of lumen to cecum confirmed by visualization of ileocecal valve and appendiceal orifice.  Scope was slowly withdrawn circumferentially examining all mucosal surfaces.  Bowel preparation was good.  There were no mucosal abnormalities.  Minimal sigmoid diverticulosis was present.  Tolerated well.    RECOMMENDATION FOR FUTURE SURVEILLANCE:  10 years    Paresh Mata M.D.

## (undated) DEVICE — MSK ENDO PORT O2 POM ELITE CURAPLEX A/

## (undated) DEVICE — PK ARTHSCP 40

## (undated) DEVICE — KT ORCA ORCAPOD DISP STRL

## (undated) DEVICE — SKIN PREP TRAY W/CHG: Brand: MEDLINE INDUSTRIES, INC.

## (undated) DEVICE — TUBING, SUCTION, 1/4" X 10', STRAIGHT: Brand: MEDLINE

## (undated) DEVICE — DISPOSABLE TOURNIQUET CUFF SINGLE BLADDER, SINGLE PORT AND QUICK CONNECT CONNECTOR: Brand: COLOR CUFF

## (undated) DEVICE — ADAPT CLN BIOGUARD AIR/H2O DISP

## (undated) DEVICE — BNDG ESMARK STRL 6INX12FT LF

## (undated) DEVICE — UNDERCAST PADDING: Brand: DEROYAL

## (undated) DEVICE — SUT ETHLN 3/0 PS1 18IN 1663H

## (undated) DEVICE — BLD DISSCT COOL CUT SJ CRVD 4MM 13CM

## (undated) DEVICE — BLCK/BITE BLOX W/DENTL/RIM W/STRAP 54F

## (undated) DEVICE — GLV SURG SENSICARE POLYISPRN W/ALOE PF LF 9 GRN STRL

## (undated) DEVICE — GLV SURG PREMIERPRO ORTHO LTX PF SZ8.5 BRN

## (undated) DEVICE — PAD,ABDOMINAL,8"X10",ST,LF: Brand: MEDLINE

## (undated) DEVICE — LN SMPL CO2 SHTRM SD STREAM W/M LUER

## (undated) DEVICE — SENSR O2 OXIMAX FNGR A/ 18IN NONSTR

## (undated) DEVICE — DRSNG GZ PETROLTM XEROFORM CURAD 1X8IN STRL

## (undated) DEVICE — APPL CHLORAPREP HI/LITE 26ML ORNG

## (undated) DEVICE — BNDG ELAS ELITE V/CLOSE 6IN 5YD LF STRL